# Patient Record
Sex: FEMALE | Race: WHITE | NOT HISPANIC OR LATINO | Employment: PART TIME | URBAN - METROPOLITAN AREA
[De-identification: names, ages, dates, MRNs, and addresses within clinical notes are randomized per-mention and may not be internally consistent; named-entity substitution may affect disease eponyms.]

---
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
## 2020-07-02 ENCOUNTER — HOSPITAL ENCOUNTER (EMERGENCY)
Facility: MEDICAL CENTER | Age: 20
End: 2020-07-02
Attending: EMERGENCY MEDICINE | Admitting: EMERGENCY MEDICINE
Payer: MEDICAID

## 2020-07-02 ENCOUNTER — APPOINTMENT (OUTPATIENT)
Dept: URGENT CARE | Facility: CLINIC | Age: 20
End: 2020-07-02
Payer: MEDICAID

## 2020-07-02 VITALS
TEMPERATURE: 98.3 F | HEIGHT: 65 IN | SYSTOLIC BLOOD PRESSURE: 125 MMHG | WEIGHT: 141.98 LBS | BODY MASS INDEX: 23.65 KG/M2 | HEART RATE: 113 BPM | OXYGEN SATURATION: 97 % | RESPIRATION RATE: 18 BRPM | DIASTOLIC BLOOD PRESSURE: 61 MMHG

## 2020-07-02 DIAGNOSIS — N64.4 BREAST PAIN: ICD-10-CM

## 2020-07-02 LAB — EKG IMPRESSION: NORMAL

## 2020-07-02 PROCEDURE — 93005 ELECTROCARDIOGRAM TRACING: CPT | Performed by: EMERGENCY MEDICINE

## 2020-07-02 PROCEDURE — 99283 EMERGENCY DEPT VISIT LOW MDM: CPT

## 2020-07-02 PROCEDURE — 93005 ELECTROCARDIOGRAM TRACING: CPT

## 2020-07-02 RX ORDER — CEPHALEXIN 500 MG/1
500 TABLET ORAL 3 TIMES DAILY
Qty: 21 TAB | Refills: 0 | Status: SHIPPED | OUTPATIENT
Start: 2020-07-02 | End: 2021-06-25

## 2020-07-02 NOTE — ED TRIAGE NOTES
".  Chief Complaint   Patient presents with   • Breast Pain     right     ./76   Pulse (!) 150   Temp 36.8 °C (98.3 °F) (Temporal)   Resp 18   Ht 1.651 m (5' 5\")   Wt 64.4 kg (141 lb 15.6 oz)   LMP  (Within Weeks)   SpO2 97%   BMI 23.63 kg/m²     Ambulatory to triage with above complaints, patient states \"I think I have mastitis\", wearing mask, denies respiratory complaints, placed in lobby with significant other.    "

## 2020-07-02 NOTE — ED PROVIDER NOTES
"ED Provider Note    CHIEF COMPLAINT  Chief Complaint   Patient presents with   • Breast Pain     right       HPI  Tiffany Lowe is a 20 y.o. female who presents with right breast pain since yesterday.  She is breast-feeding and has had mastitis once in the past.  She thinks it is mildly red and swollen.  No fevers, chills, cough, vomiting or diarrhea.    REVIEW OF SYSTEMS  Pertinent positives include: Right breast pain, breast feeding, tachycardia.  Pertinent negatives include: Fever, cough, sore throat.    PAST MEDICAL HISTORY  Past Medical History:   Diagnosis Date   • Asthma    • Depression        SOCIAL HISTORY  Here with her significant other.    CURRENT MEDICATIONS  Home Medications     Reviewed by Chelsea Tavarez R.N. (Registered Nurse) on 07/02/20 at 1305  Med List Status: Complete   Medication Last Dose Status   albuterol 108 (90 Base) MCG/ACT Aero Soln inhalation aerosol PRN Active   azithromycin (ZITHROMAX) 250 MG Tab Not Taking Active   cetirizine (ZYRTEC) 10 MG Tab  Active   docusate sodium (COLACE) 100 MG Cap  Active   Doxylamine Succinate, Sleep, (UNISOM PO)  Active   fluticasone (FLONASE) 50 MCG/ACT nasal spray  Active   ibuprofen (MOTRIN) 600 MG Tab  Active   MethylPREDNISolone (MEDROL DOSEPAK) 4 MG Tablet Therapy Pack  Active   oxycodone-acetaminophen (PERCOCET) 5-325 MG Tab  Active   Prenatal Multivit-Min-Fe-FA (PRENATAL VITAMINS PO)  Active   Sertraline HCl (ZOLOFT PO) Not Taking Active                ALLERGIES  No Known Allergies    PHYSICAL EXAM  VITAL SIGNS: /76   Pulse (!) 150   Temp 36.8 °C (98.3 °F) (Temporal)   Resp 18   Ht 1.651 m (5' 5\")   Wt 64.4 kg (141 lb 15.6 oz)   LMP  (Within Weeks)   SpO2 97%   BMI 23.63 kg/m²   Constitutional :  Well developed, Well nourished, tachycardic, afebrile, well-appearing.   Eyes: pupils reactive without eye discharge nor conjunctival hyperemia.  Lymphatic: No lymphangitic streaking  Cardiovascular: Tachycardic, regular rhythm, No " murmurs, No rubs, No gallops.  No cyanosis.   Respiratory: Rales, rhonchi, wheeze.  Right breast has tenderness between 1 and 3:00.  No masses no definite erythema or edema.  No discharge.  Abdomen:  Soft, nontender  Skin: Warm, dry, no erythema, no rash.   Musculoskeletal: no limb deformities.    EKG Interpretation 1:56 PM    Interpreted by me.  Indication: Tachycardia    Rhythm: normal sinus   Rate: Tachycardic at 133  Axis: normal  Ectopy: none  Conduction: normal  ST/T Waves: no acute change  Q Waves: none  R Wave progression: normal    Clinical Impression: Sinus tachycardia      COURSE & MEDICAL DECISION MAKING  Well-appearing patient has breast pain and may have mastitis.  No evidence of abscess.  No trauma.    PLAN:  New Prescriptions    CEPHALEXIN 500 MG TAB    Take 1 Tab by mouth 3 times a day.       Continue to breast-feed  Return for fever, spreading redness, swelling, purulence, lymphangitis  Follow-up at [facility] for imaging if not better next week    [facility], Emergency Dept  [address] [place] [id number] [phone]    As needed worsening redness, swelling, red streaks, fever or discharge of pus.      CONDITION:  Good.    FINAL IMPRESSION:  1. Breast pain          Electronically signed by: Salvatore Canela M.D., 7/2/2020

## 2020-07-02 NOTE — DISCHARGE INSTRUCTIONS
You may have mastitis.  Take antibiotic as prescribed and continue to breast-feed.  Follow-up at the [facility] for ultrasound if you are not better next week.  Return for persistent fever dizziness vomiting spreading redness around the parents.

## 2020-10-28 ENCOUNTER — APPOINTMENT (OUTPATIENT)
Dept: URGENT CARE | Facility: CLINIC | Age: 20
End: 2020-10-28
Payer: MEDICAID

## 2021-06-25 ENCOUNTER — OFFICE VISIT (OUTPATIENT)
Dept: URGENT CARE | Facility: CLINIC | Age: 21
End: 2021-06-25
Payer: MEDICAID

## 2021-06-25 ENCOUNTER — APPOINTMENT (OUTPATIENT)
Dept: URGENT CARE | Facility: CLINIC | Age: 21
End: 2021-06-25
Payer: MEDICAID

## 2021-06-25 ENCOUNTER — HOSPITAL ENCOUNTER (OUTPATIENT)
Facility: MEDICAL CENTER | Age: 21
End: 2021-06-25
Attending: NURSE PRACTITIONER
Payer: MEDICAID

## 2021-06-25 VITALS
HEART RATE: 93 BPM | RESPIRATION RATE: 16 BRPM | SYSTOLIC BLOOD PRESSURE: 122 MMHG | DIASTOLIC BLOOD PRESSURE: 60 MMHG | BODY MASS INDEX: 20.31 KG/M2 | WEIGHT: 129.4 LBS | HEIGHT: 67 IN | OXYGEN SATURATION: 96 % | TEMPERATURE: 99.5 F

## 2021-06-25 DIAGNOSIS — J06.9 UPPER RESPIRATORY TRACT INFECTION, UNSPECIFIED TYPE: ICD-10-CM

## 2021-06-25 DIAGNOSIS — R05.9 COUGH: ICD-10-CM

## 2021-06-25 DIAGNOSIS — J02.9 PHARYNGITIS, UNSPECIFIED ETIOLOGY: ICD-10-CM

## 2021-06-25 PROBLEM — M25.521 ARTHRALGIA OF RIGHT ELBOW: Status: ACTIVE | Noted: 2020-10-26

## 2021-06-25 PROBLEM — F43.21 ADJUSTMENT DISORDER WITH DEPRESSED MOOD: Status: ACTIVE | Noted: 2018-01-03

## 2021-06-25 PROBLEM — K92.9 GASTROINTESTINAL DISORDER: Status: ACTIVE | Noted: 2018-01-03

## 2021-06-25 LAB
INT CON NEG: NORMAL
INT CON POS: NORMAL
S PYO AG THROAT QL: NEGATIVE

## 2021-06-25 PROCEDURE — U0005 INFEC AGEN DETEC AMPLI PROBE: HCPCS

## 2021-06-25 PROCEDURE — 99213 OFFICE O/P EST LOW 20 MIN: CPT | Performed by: NURSE PRACTITIONER

## 2021-06-25 PROCEDURE — C9803 HOPD COVID-19 SPEC COLLECT: HCPCS

## 2021-06-25 PROCEDURE — U0003 INFECTIOUS AGENT DETECTION BY NUCLEIC ACID (DNA OR RNA); SEVERE ACUTE RESPIRATORY SYNDROME CORONAVIRUS 2 (SARS-COV-2) (CORONAVIRUS DISEASE [COVID-19]), AMPLIFIED PROBE TECHNIQUE, MAKING USE OF HIGH THROUGHPUT TECHNOLOGIES AS DESCRIBED BY CMS-2020-01-R: HCPCS

## 2021-06-25 PROCEDURE — 87880 STREP A ASSAY W/OPTIC: CPT | Performed by: NURSE PRACTITIONER

## 2021-06-25 RX ORDER — MEDROXYPROGESTERONE ACETATE 150 MG/ML
INJECTION, SUSPENSION INTRAMUSCULAR
COMMUNITY

## 2021-06-25 ASSESSMENT — ENCOUNTER SYMPTOMS
FEVER: 0
DIZZINESS: 0
MYALGIAS: 0
VOMITING: 0
CHILLS: 0
SORE THROAT: 1
RHINORRHEA: 1
EYE REDNESS: 0
COUGH: 1
HEADACHES: 1
SHORTNESS OF BREATH: 0
NAUSEA: 0

## 2021-06-25 NOTE — LETTER
June 25, 2021         Patient: Tiffany Lowe   YOB: 2000   Date of Visit: 6/25/2021           To Whom it May Concern:    Tiffany Lowe was seen in my clinic on 6/25/2021. She may return to work on 6/28/21, only if COVID test is negative.    If you have any questions or concerns, please don't hesitate to call.        Sincerely,           SANDRA Akbar.  Electronically Signed

## 2021-06-26 DIAGNOSIS — R05.9 COUGH: ICD-10-CM

## 2021-06-26 LAB
COVID ORDER STATUS COVID19: NORMAL
SARS-COV-2 RNA RESP QL NAA+PROBE: NOTDETECTED
SPECIMEN SOURCE: NORMAL

## 2021-06-26 NOTE — PROGRESS NOTES
Subjective:   Tiffany Lowe is a 21 y.o. female who presents for Cough (x1.5 wks pt states all symptoms come and go), Nasal Congestion (x1.5 wks ), Sore Throat (x1.5 wks), and Headache (x1.5 wks)      URI   This is a new problem. Episode onset: 1.5 weeks; no sick contacts. The problem has been gradually improving. There has been no fever. Associated symptoms include congestion, coughing, headaches, rhinorrhea and a sore throat. Pertinent negatives include no chest pain, dysuria, ear pain, nausea, plugged ear sensation, rash or vomiting. She has tried acetaminophen and sleep for the symptoms. The treatment provided moderate relief.       Review of Systems   Constitutional: Negative for chills and fever.   HENT: Positive for congestion, rhinorrhea and sore throat. Negative for ear pain.    Eyes: Negative for redness.   Respiratory: Positive for cough. Negative for shortness of breath.    Cardiovascular: Negative for chest pain.   Gastrointestinal: Negative for nausea and vomiting.   Genitourinary: Negative for dysuria.   Musculoskeletal: Negative for myalgias.   Skin: Negative for rash.   Neurological: Positive for headaches. Negative for dizziness.       Medications:    • UNISOM PO    Allergies: Patient has no known allergies.    Problem List: Tiffany Lowe does not have any pertinent problems on file.    Surgical History:  Past Surgical History:   Procedure Laterality Date   • REPEAT C SECTION  8/30/2018    Procedure: REPEAT C SECTION;  Surgeon: Oj Figueroa M.D.;  Location: LABOR AND DELIVERY;  Service: Gynecology   • PRIMARY C SECTION N/A 4/14/2017    Procedure: PRIMARY C SECTION;  Surgeon: Corby Harden M.D.;  Location: LABOR AND DELIVERY;  Service:    • TONSILLECTOMY  2007       Past Social Hx: Tiffany Lowe  reports that she quit smoking about 3 years ago. She has never used smokeless tobacco. She reports current alcohol use. She reports current drug use. Drug: Marijuana.     Past Family Hx:  "Tiffany Lowe family history includes No Known Problems in her brother, father, and mother.     Problem list, medications, and allergies reviewed by myself today in Epic.     Objective:     /60 (BP Location: Right arm, Patient Position: Sitting, BP Cuff Size: Adult)   Pulse 93   Temp 37.5 °C (99.5 °F) (Temporal)   Resp 16   Ht 1.702 m (5' 7\")   Wt 58.7 kg (129 lb 6.4 oz)   SpO2 96%   BMI 20.27 kg/m²     Physical Exam  Vitals and nursing note reviewed.   Constitutional:       General: She is not in acute distress.     Appearance: She is well-developed.   HENT:      Head: Normocephalic and atraumatic.      Right Ear: Tympanic membrane and external ear normal.      Left Ear: Tympanic membrane and external ear normal.      Nose: Nose normal.      Right Sinus: No maxillary sinus tenderness or frontal sinus tenderness.      Left Sinus: No maxillary sinus tenderness or frontal sinus tenderness.      Mouth/Throat:      Mouth: Mucous membranes are moist.      Pharynx: Uvula midline. No posterior oropharyngeal erythema.      Tonsils: No tonsillar exudate or tonsillar abscesses.   Eyes:      General:         Right eye: No discharge.         Left eye: No discharge.      Conjunctiva/sclera: Conjunctivae normal.   Cardiovascular:      Rate and Rhythm: Normal rate.   Pulmonary:      Effort: Pulmonary effort is normal. No respiratory distress.      Breath sounds: Normal breath sounds.   Abdominal:      General: There is no distension.   Musculoskeletal:         General: Normal range of motion.   Skin:     General: Skin is warm and dry.   Neurological:      General: No focal deficit present.      Mental Status: She is alert and oriented to person, place, and time. Mental status is at baseline.      Gait: Gait (gait at baseline) normal.   Psychiatric:         Judgment: Judgment normal.         Assessment/Plan:     Diagnosis and associated orders:     1. Cough  COVID/SARS CoV-2 PCR   2. Upper respiratory tract " infection, unspecified type     3. Pharyngitis, unspecified etiology  POCT Rapid Strep A      Comments/MDM:     • Strep negative  Discussed with patient signs and symptoms most likely are due to a viral etiology.     Test for COVID-19. Result will be reviewed by myself. We will call/message back for results and appropriate further instructions. Instructed to sign up for [employer] if they have not already. Result will be automatically released to [employer] application for patient review. I will be sending a message with Next Step Instructions to [employer] soon after resulted.   Symptomatic and supportive care:   Plenty of oral hydration and rest   Over the counter cough suppressant as directed.  Tylenol or ibuprofen for pain and fever as directed.   Warm salt water gargles for sore throat, soft foods, cool liquids.   Saline nasal spray and Flonase as a decongestant.   Infection control measures at home. Stay away from people, Hand washing, covering sneeze/cough, disinfect surfaces.   Remain home from work, school, and other populated environments. Work note provided with information of quarantine measures per CDC guidelines.   Overall, the patient is well-appearing. They are not hypoxic, afebrile, and a normal pulmonary exam.                 Please note that this dictation was created using voice recognition software. I have made a reasonable attempt to correct obvious errors, but I expect that there are errors of grammar and possibly content that I did not discover before finalizing the note.    This note was electronically signed by Jose ROSARIO.
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
## 2022-05-20 ENCOUNTER — OFFICE VISIT (OUTPATIENT)
Dept: URGENT CARE | Facility: CLINIC | Age: 22
End: 2022-05-20
Payer: MEDICAID

## 2022-05-20 VITALS
HEIGHT: 67 IN | DIASTOLIC BLOOD PRESSURE: 76 MMHG | OXYGEN SATURATION: 98 % | WEIGHT: 124 LBS | TEMPERATURE: 97.9 F | RESPIRATION RATE: 20 BRPM | SYSTOLIC BLOOD PRESSURE: 118 MMHG | BODY MASS INDEX: 19.46 KG/M2 | HEART RATE: 68 BPM

## 2022-05-20 DIAGNOSIS — H66.90 ACUTE RECURRENT OTITIS MEDIA: ICD-10-CM

## 2022-05-20 PROCEDURE — 99214 OFFICE O/P EST MOD 30 MIN: CPT | Performed by: NURSE PRACTITIONER

## 2022-05-20 RX ORDER — AMOXICILLIN 875 MG/1
875 TABLET, COATED ORAL 2 TIMES DAILY
Qty: 14 TABLET | Refills: 0 | Status: SHIPPED | OUTPATIENT
Start: 2022-05-20 | End: 2022-05-27

## 2022-05-20 NOTE — PROGRESS NOTES
Chief Complaint   Patient presents with   • Otalgia     Herman x 2 weeks       HISTORY OF PRESENT ILLNESS: Patient is a pleasant 22 y.o. female who presents to urgent care today with concerns of left-sided ear pain.  Patient notes that for the past 2 weeks she has had left-sided ear pain.  She initially was evaluated by her PCP and treated with topical antibiotic drops.  She denied any improvement and therefore her PCP called in azithromycin for her.  Patient completed this medication without improvement although has had some resolved.  She otherwise feels well denies any fever, chills, malaise.  She does have a history of mild seasonal allergies, does not take any medication for such.    Patient Active Problem List    Diagnosis Date Noted   • Feeling irritable 2021   • Arthralgia of right elbow 10/26/2020   • Postpartum care following  delivery 10/10/2018   • Depression 04/10/2018   • Asthma 04/10/2018   • Adjustment disorder with depressed mood 2018   • Gastrointestinal disorder 2018   • Healthy adolescent 2018       Allergies:Patient has no known allergies.    Current Outpatient Medications Ordered in Epic   Medication Sig Dispense Refill   • amoxicillin (AMOXIL) 875 MG tablet Take 1 Tablet by mouth 2 times a day for 7 days. 14 Tablet 0   • medroxyPROGESTERone (DEPO-PROVERA) 150 MG/ML Suspension medroxyprogesterone 150 mg/mL intramuscular suspension (Patient not taking: Reported on 2022)     • Doxylamine Succinate, Sleep, (UNISOM PO) Take  by mouth. (Patient not taking: Reported on 2022)       No current [facility]-ordered facility-administered medications on file.       Past Medical History:   Diagnosis Date   • Asthma    • Depression        Social History     Tobacco Use   • Smoking status: Former Smoker     Quit date: 1/10/2018     Years since quittin.3   • Smokeless tobacco: Never Used   • Tobacco comment: Vape   Vaping Use   • Vaping Use: Every day   • Substances:  "Flavoring   • Devices: Disposable   Substance Use Topics   • Alcohol use: Yes     Comment: socially    • Drug use: Yes     Types: Marijuana     Comment: Last used 2 weeks ago       Family Status   Relation Name Status   • Mo  Alive   • Fa  Alive   • Bro 4 Alive   • Neg Hx  (Not Specified)     Family History   Problem Relation Age of Onset   • No Known Problems Mother    • No Known Problems Father    • No Known Problems Brother    • Stroke Neg Hx        ROS:  Review of Systems   Constitutional: Negative for fever, chills, weight loss, malaise, and fatigue.   HENT: Positive for ear pain.   Negative for nosebleeds, congestion, sore throat and neck pain.    Eyes: Negative for vision changes.   Neuro: Negative for headache, sensory changes, weakness, seizure, LOC.   Cardiovascular: Negative for chest pain, palpitations, orthopnea and leg swelling.   Respiratory: Negative for cough, sputum production, shortness of breath and wheezing.   Gastrointestinal: Negative for abdominal pain, nausea, vomiting or diarrhea.   Genitourinary: Negative for dysuria, urgency and frequency.  Musculoskeletal: Negative for falls, neck pain, back pain, joint pain, myalgias.   Skin: Negative for rash, diaphoresis.     Exam:  /76 (BP Location: Left arm, Patient Position: Sitting, BP Cuff Size: Adult)   Pulse 68   Temp 36.6 °C (97.9 °F) (Temporal)   Resp 20   Ht 1.702 m (5' 7\")   Wt 56.2 kg (124 lb)   SpO2 98%   General: well-nourished, well-developed female in NAD  Head: normocephalic, atraumatic  Eyes: PERRLA, no conjunctival injection, acuity grossly intact, lids normal.  Ears: normal shape and symmetry, no tenderness, no discharge. External canals are without any significant edema or erythema.  Right tympanic membrane is without any inflammation, no effusion.  Left tympanic membrane is moderately injected, intact, scant effusion.  Gross auditory acuity is intact.  Nose: symmetrical without tenderness, clear " discharge.  Mouth/Throat: reasonable hygiene, no erythema, exudates or tonsillar enlargement.  Neck: no masses, range of motion within normal limits, no tracheal deviation. No obvious thyroid enlargement.   Lymph: no cervical adenopathy. No supraclavicular adenopathy.   Neuro: alert and oriented. Cranial nerves 1-12 grossly intact. No sensory deficit.   Cardiovascular: regular rate and rhythm. No edema.  Pulmonary: no distress. Chest is symmetrical with respiration, no wheezes, crackles, or rhonchi.   Musculoskeletal: no clubbing, appropriate muscle tone, gait is stable.  Skin: warm, dry, intact, no clubbing, no cyanosis, no rashes.   Psych: appropriate mood, affect, judgement.         Assessment/Plan:  1. Acute recurrent otitis media  amoxicillin (AMOXIL) 875 MG tablet       Suspect recurrent otitis media.  Amoxicillin as directed.  OTC allergy medication and Flonase encouraged.  Increase fluid intake and hydration.  Supportive care, differential diagnoses, and indications for immediate follow-up discussed with patient.   Pathogenesis of diagnosis discussed including typical length and natural progression.   Instructed to return to clinic or nearest emergency department for any change in condition, further concerns, or worsening of symptoms.  Patient states understanding of the plan of care and discharge instructions.  Instructed to make an appointment, for follow up, with her primary care provider.        Please note that this dictation was created using voice recognition software. I have made every reasonable attempt to correct obvious errors, but I expect that there are errors of grammar and possibly content that I did not discover before finalizing the note.      SANDRA Montes.
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
## 2023-07-10 ENCOUNTER — OFFICE VISIT (OUTPATIENT)
Dept: URGENT CARE | Facility: CLINIC | Age: 23
End: 2023-07-10
Payer: COMMERCIAL

## 2023-07-10 VITALS
WEIGHT: 220 LBS | TEMPERATURE: 98.5 F | HEIGHT: 63 IN | DIASTOLIC BLOOD PRESSURE: 82 MMHG | OXYGEN SATURATION: 98 % | BODY MASS INDEX: 38.98 KG/M2 | HEART RATE: 118 BPM | SYSTOLIC BLOOD PRESSURE: 118 MMHG | RESPIRATION RATE: 16 BRPM

## 2023-07-10 DIAGNOSIS — R05.1 ACUTE COUGH: ICD-10-CM

## 2023-07-10 DIAGNOSIS — J01.10 ACUTE NON-RECURRENT FRONTAL SINUSITIS: ICD-10-CM

## 2023-07-10 PROCEDURE — 3079F DIAST BP 80-89 MM HG: CPT | Performed by: PHYSICIAN ASSISTANT

## 2023-07-10 PROCEDURE — 99204 OFFICE O/P NEW MOD 45 MIN: CPT | Performed by: PHYSICIAN ASSISTANT

## 2023-07-10 PROCEDURE — 3074F SYST BP LT 130 MM HG: CPT | Performed by: PHYSICIAN ASSISTANT

## 2023-07-10 RX ORDER — PROGESTERONE 100 MG/1
CAPSULE ORAL
COMMUNITY
Start: 2023-06-05 | End: 2023-12-18

## 2023-07-10 RX ORDER — BENZONATATE 100 MG/1
100 CAPSULE ORAL 3 TIMES DAILY PRN
Qty: 60 CAPSULE | Refills: 0 | Status: SHIPPED | OUTPATIENT
Start: 2023-07-10 | End: 2023-12-18

## 2023-07-10 RX ORDER — GUAIFENESIN 600 MG/1
600 TABLET, EXTENDED RELEASE ORAL EVERY 12 HOURS
COMMUNITY
End: 2023-12-18

## 2023-07-10 RX ORDER — DIVALPROEX SODIUM 500 MG/1
1000 TABLET, EXTENDED RELEASE ORAL
COMMUNITY
Start: 2023-06-29 | End: 2023-12-18

## 2023-07-10 RX ORDER — CARIPRAZINE 1.5 MG/1
1.5 CAPSULE, GELATIN COATED ORAL
COMMUNITY
Start: 2023-06-15 | End: 2023-12-18

## 2023-07-10 RX ORDER — AMOXICILLIN AND CLAVULANATE POTASSIUM 875; 125 MG/1; MG/1
1 TABLET, FILM COATED ORAL 2 TIMES DAILY
Qty: 14 TABLET | Refills: 0 | Status: SHIPPED | OUTPATIENT
Start: 2023-07-10 | End: 2023-07-17

## 2023-07-10 RX ORDER — TRAZODONE HYDROCHLORIDE 50 MG/1
50 TABLET ORAL
COMMUNITY
Start: 2023-06-06 | End: 2023-12-18

## 2023-07-10 RX ORDER — SPIRONOLACTONE 100 MG/1
TABLET, FILM COATED ORAL
COMMUNITY
Start: 2023-06-05 | End: 2023-12-18

## 2023-07-10 ASSESSMENT — ENCOUNTER SYMPTOMS
FEVER: 0
SHORTNESS OF BREATH: 0
PALPITATIONS: 0
SPUTUM PRODUCTION: 0
WHEEZING: 0
CHILLS: 0
COUGH: 1
HEMOPTYSIS: 0
SINUS PAIN: 1

## 2023-07-10 NOTE — PROGRESS NOTES
Subjective:   Mariela Soni is a 23 y.o. female who presents today with   Chief Complaint   Patient presents with    Nasal Congestion     X 1.5wk     Cough     X 1.5 wk        Cough  This is a new problem. The current episode started 1 to 4 weeks ago. The problem has been gradually worsening. Associated symptoms include nasal congestion and postnasal drip. Pertinent negatives include no chest pain, chills, fever, hemoptysis, shortness of breath or wheezing. Treatments tried: mucinex, otc sinus. The treatment provided no relief.       PMH:  has no past medical history on file.  MEDS:   Current Outpatient Medications:     VRAYLAR 1.5 MG capsule, Take 1.5 mg by mouth at bedtime., Disp: , Rfl:     divalproex ER (DEPAKOTE ER) 500 MG TABLET SR 24 HR, Take 1,000 mg by mouth at bedtime., Disp: , Rfl:     progesterone (PROMETRIUM) 100 MG Cap, TAKE 1 CAPSULE BY MOUTH DAILY FOR GENDER AFFIRMING CARE., Disp: , Rfl:     spironolactone (ALDACTONE) 100 MG Tab, TAKE 2 TABLETS BY MOUTH ONCE DAILY FOR HORMONE REPLACEMENT THERAPY, Disp: , Rfl:     traZODone (DESYREL) 50 MG Tab, Take 50 mg by mouth at bedtime., Disp: , Rfl:     guaiFENesin ER (MUCINEX) 600 MG TABLET SR 12 HR, Take 600 mg by mouth every 12 hours., Disp: , Rfl:     amoxicillin-clavulanate (AUGMENTIN) 875-125 MG Tab, Take 1 Tablet by mouth 2 times a day for 7 days., Disp: 14 Tablet, Rfl: 0    benzonatate (TESSALON) 100 MG Cap, Take 1 Capsule by mouth 3 times a day as needed for Cough., Disp: 60 Capsule, Rfl: 0  ALLERGIES: No Known Allergies  SURGHX: No past surgical history on file.  SOCHX:    FH: Reviewed with patient, not pertinent to this visit.     Review of Systems   Constitutional:  Negative for chills and fever.   HENT:  Positive for congestion, postnasal drip and sinus pain.    Respiratory:  Positive for cough. Negative for hemoptysis, sputum production, shortness of breath and wheezing.    Cardiovascular:  Negative for chest pain and palpitations.  "       Objective:   /82   Pulse (!) 118   Temp 36.9 °C (98.5 °F)   Resp 16   Ht 1.6 m (5' 3\")   Wt 99.8 kg (220 lb)   SpO2 98%   BMI 38.97 kg/m²   Physical Exam  Vitals and nursing note reviewed.   Constitutional:       General: She is not in acute distress.     Appearance: Normal appearance. She is well-developed. She is not ill-appearing or toxic-appearing.   HENT:      Head: Normocephalic and atraumatic.      Right Ear: Hearing, tympanic membrane and ear canal normal.      Left Ear: Hearing, tympanic membrane and ear canal normal.      Nose: Mucosal edema and congestion present.      Right Sinus: Frontal sinus tenderness present.      Left Sinus: Frontal sinus tenderness present.      Mouth/Throat:      Mouth: Mucous membranes are moist.      Pharynx: No oropharyngeal exudate or posterior oropharyngeal erythema.   Cardiovascular:      Rate and Rhythm: Normal rate and regular rhythm.      Heart sounds: Normal heart sounds.   Pulmonary:      Effort: Pulmonary effort is normal.      Breath sounds: Normal breath sounds. No stridor. No wheezing, rhonchi or rales.   Musculoskeletal:      Comments: Normal movement in all 4 extremities   Skin:     General: Skin is warm and dry.   Neurological:      Mental Status: She is alert.      Coordination: Coordination normal.   Psychiatric:         Mood and Affect: Mood normal.         Assessment/Plan:   Assessment    1. Acute non-recurrent frontal sinusitis  - amoxicillin-clavulanate (AUGMENTIN) 875-125 MG Tab; Take 1 Tablet by mouth 2 times a day for 7 days.  Dispense: 14 Tablet; Refill: 0    2. Acute cough  - benzonatate (TESSALON) 100 MG Cap; Take 1 Capsule by mouth 3 times a day as needed for Cough.  Dispense: 60 Capsule; Refill: 0    Other orders  - VRAYLAR 1.5 MG capsule; Take 1.5 mg by mouth at bedtime.  - divalproex ER (DEPAKOTE ER) 500 MG TABLET SR 24 HR; Take 1,000 mg by mouth at bedtime.  - progesterone (PROMETRIUM) 100 MG Cap; TAKE 1 CAPSULE BY MOUTH " DAILY FOR GENDER AFFIRMING CARE.  - spironolactone (ALDACTONE) 100 MG Tab; TAKE 2 TABLETS BY MOUTH ONCE DAILY FOR HORMONE REPLACEMENT THERAPY  - traZODone (DESYREL) 50 MG Tab; Take 50 mg by mouth at bedtime.  - guaiFENesin ER (MUCINEX) 600 MG TABLET SR 12 HR; Take 600 mg by mouth every 12 hours.    Symptoms and presentation appear consistent with respiratory tract infection as well as sinus infection and we will treat accordingly with antibiotics.  Recommend use of over-the-counter symptomatic relief as well per 's instructions.  No indication for chest x-ray on exam today.    Differential diagnosis, natural history, supportive care, and indications for immediate follow-up discussed.   Patient given instructions and understanding of medications and treatment.    If not improving in 3-5 days, F/U with PCP or return to  if symptoms worsen.    Patient agreeable to plan.      Please note that this dictation was created using voice recognition software. I have made every reasonable attempt to correct obvious errors, but I expect that there are errors of grammar and possibly content that I did not discover before finalizing the note.    Jamarcus Zhao PA-C

## 2023-07-30 ENCOUNTER — OFFICE VISIT (OUTPATIENT)
Dept: URGENT CARE | Facility: CLINIC | Age: 23
End: 2023-07-30
Payer: COMMERCIAL

## 2023-07-30 ENCOUNTER — APPOINTMENT (OUTPATIENT)
Dept: RADIOLOGY | Facility: IMAGING CENTER | Age: 23
End: 2023-07-30
Attending: STUDENT IN AN ORGANIZED HEALTH CARE EDUCATION/TRAINING PROGRAM
Payer: COMMERCIAL

## 2023-07-30 VITALS
TEMPERATURE: 97.3 F | HEIGHT: 63 IN | DIASTOLIC BLOOD PRESSURE: 70 MMHG | SYSTOLIC BLOOD PRESSURE: 102 MMHG | OXYGEN SATURATION: 98 % | HEART RATE: 101 BPM | BODY MASS INDEX: 37.92 KG/M2 | WEIGHT: 214 LBS | RESPIRATION RATE: 16 BRPM

## 2023-07-30 DIAGNOSIS — S99.912A INJURY OF LEFT ANKLE, INITIAL ENCOUNTER: ICD-10-CM

## 2023-07-30 DIAGNOSIS — S92.152A AVULSION FRACTURE OF LEFT TALUS, CLOSED, INITIAL ENCOUNTER: ICD-10-CM

## 2023-07-30 PROCEDURE — 3074F SYST BP LT 130 MM HG: CPT | Performed by: STUDENT IN AN ORGANIZED HEALTH CARE EDUCATION/TRAINING PROGRAM

## 2023-07-30 PROCEDURE — 73630 X-RAY EXAM OF FOOT: CPT | Mod: TC,LT | Performed by: RADIOLOGY

## 2023-07-30 PROCEDURE — 73610 X-RAY EXAM OF ANKLE: CPT | Mod: TC,LT | Performed by: RADIOLOGY

## 2023-07-30 PROCEDURE — 3078F DIAST BP <80 MM HG: CPT | Performed by: STUDENT IN AN ORGANIZED HEALTH CARE EDUCATION/TRAINING PROGRAM

## 2023-07-30 PROCEDURE — 99214 OFFICE O/P EST MOD 30 MIN: CPT | Performed by: STUDENT IN AN ORGANIZED HEALTH CARE EDUCATION/TRAINING PROGRAM

## 2023-07-30 NOTE — LETTER
July 30, 2023    To Whom It May Concern:         This is confirmation that Mariela Soni attended her scheduled appointment with Luis E Hall P.A.-C. on 7/30/23.  Patient will need to use crutches and a walking boot while at work.  I would prefer the patient to be on light duty/desk duty until evaluated by orthopedics.  Patient should be able to be seen by orthopedics within the next 1 to 2 weeks.         If you have any questions please do not hesitate to call me at the phone number listed below.    Sincerely,          Luis E Hall P.A.-C.  [phone]

## 2023-07-30 NOTE — PROGRESS NOTES
"Subjective:   Mariela Soni is a 23 y.o. female who presents for Ankle Injury (L ankle )      HPI:  This is a pleasant 23-year-old female who presents to the urgent care for left ankle/foot injury.  She reports that she was going upstairs and struck the outside of her foot against the stairs causing her injury.  She reports pain to the lateral aspect of her ankle and the top of her foot.  She states that the foot hurts more than the ankle.  She is able to walk and stand but does have some discomfort with doing so.  Pain is controlled as long as she is not standing on her foot.  Ibuprofen/Tylenol at home.  No numbness, tingling, burning, radiation of pain into the toes or up the leg, laceration, fever, chills, shortness of breath, or chest pain.    Medications:    benzonatate Caps  divalproex ER Tb24  guaiFENesin ER Tb12  progesterone Caps  spironolactone Tabs  traZODone Tabs  Vraylar Caps    Allergies: Patient has no known allergies.    Problem List: Mariela Soni does not have a problem list on file.    Surgical History:  No past surgical history on file.    Past Social Hx: Mariela Soni       Past Family Hx:  Mariela Soni family history is not on file.     Problem list, medications, and allergies reviewed by myself today in Epic.     Objective:     /70 (BP Location: Left arm, Patient Position: Sitting, BP Cuff Size: Adult)   Pulse (!) 101   Temp 36.3 °C (97.3 °F) (Temporal)   Resp 16   Ht 1.6 m (5' 3\")   Wt 97.1 kg (214 lb)   SpO2 98%   BMI 37.91 kg/m²     Physical Exam  Vitals reviewed.   Constitutional:       General: She is not in acute distress.     Appearance: Normal appearance.   HENT:      Head: Normocephalic.   Eyes:      Pupils: Pupils are equal, round, and reactive to light.   Cardiovascular:      Rate and Rhythm: Normal rate.      Pulses: Normal pulses.   Pulmonary:      Effort: Pulmonary effort is normal.      Breath sounds: Normal breath " sounds.   Musculoskeletal:      Comments: Left ankle/foot: Mild swelling present to the lateral aspect of the ankle.  Tenderness to the lateral malleolus and proximal dorsal aspect of the foot.  Full range of motion and able to bear weight but does have pain when doing so.  2+ pedal pulse and sensation intact.  Cap refill less than 2 seconds.  No erythema or ecchymosis.  No proximal streaking.   Skin:     General: Skin is warm and dry.   Neurological:      Mental Status: She is alert.       RADIOLOGY RESULTS   DX-FOOT-COMPLETE 3+ LEFT    Result Date: 7/30/2023 7/30/2023 12:24 PM HISTORY/REASON FOR EXAM:  Pain/Deformity Following Trauma; Patient was walking upstairs and struck the lateral aspect of her foot/ankle against a stair.  Experiencing pain to the lateral malleolus and top of the foot. TECHNIQUE/EXAM DESCRIPTION AND NUMBER OF VIEWS: 3 views of the LEFT foot. COMPARISON:  Left ankle same time FINDINGS: There is no focal soft tissue swelling. There is a linear density seen just superior to the distal talus suspicious for an avulsion fracture. The alignment is maintained. Remainder of the foot is intact.     1.  There are findings suspicious for a linear avulsion fracture of the dorsal distal talus.    DX-ANKLE 3+ VIEWS LEFT    Result Date: 7/30/2023 7/30/2023 12:24 PM HISTORY/REASON FOR EXAM:  Pain/Deformity Following Trauma; Patient was walking upstairs and struck the lateral aspect of her foot/ankle against a stair.  Experiencing pain to the lateral malleolus and top of the foot. TECHNIQUE/EXAM DESCRIPTION AND NUMBER OF VIEWS:  3 views of the LEFT ankle. COMPARISON: None FINDINGS: There is no focal soft tissue swelling. There is no displaced fracture or dislocation of the tibia or fibula. There is a linear density suspicious for avulsion fracture involving the dorsal aspect of the distal talus. The alignment of the ankle is within normal limits.     1.  There are findings suspicious for linear avulsion of  the dorsal distal talus.             Assessment/Plan:     Diagnosis and associated orders:     1. Avulsion fracture of left talus, closed, initial encounter  DX-ANKLE 3+ VIEWS LEFT    DX-FOOT-COMPLETE 3+ LEFT    Referral to Orthopedics         Comments/MDM:     X-ray shows findings suspicious for linear avulsion of the dorsal distal talus.  X-ray imaging does correlate with physical exam findings/pain pattern.  Patient was placed in a rigid walking boot and given crutches.  Patient made nonweightbearing and urgent referral to orthopedics for further evaluation management.  Pain is tolerated with ibuprofen and Tylenol.  We will continue this at home.  Ice 3-5 times daily for 20 minutes at a time.  Elevate the foot as often as possible while at home.  Patient is neurovascular intact.  Strict ED/return precautions given.         Differential diagnosis, natural history, supportive care, and indications for immediate follow-up discussed.    Advised the patient to follow-up with the primary care physician for recheck, reevaluation, and consideration of further management.    Please note that this dictation was created using voice recognition software. I have made a reasonable attempt to correct obvious errors, but I expect that there are errors of grammar and possibly content that I did not discover before finalizing the note.    Electronically signed by Luis E Hall PA-C.

## 2023-07-31 NOTE — ED NOTES
Patient given discharge teaching and education. Verbalizes understanding. Given chance to ask questions, all questions answered. Educated patient of signs and symptoms to returned to ER, and educated patient they can return for any concerning symptoms. Patient states they have their belongings. Denies additional needs at this time. Reports pain is well controlled. Patient monitored every hour and PRN for safety and comfort. Patient ambulatory out of department.      Writer didn't see anything scanned in.

## 2023-12-08 ENCOUNTER — OFFICE VISIT (OUTPATIENT)
Dept: URGENT CARE | Facility: PHYSICIAN GROUP | Age: 23
End: 2023-12-08
Payer: COMMERCIAL

## 2023-12-08 VITALS
RESPIRATION RATE: 16 BRPM | OXYGEN SATURATION: 96 % | HEIGHT: 63 IN | HEART RATE: 102 BPM | TEMPERATURE: 97.7 F | WEIGHT: 207.4 LBS | SYSTOLIC BLOOD PRESSURE: 106 MMHG | BODY MASS INDEX: 36.75 KG/M2 | DIASTOLIC BLOOD PRESSURE: 72 MMHG

## 2023-12-08 DIAGNOSIS — M77.8 LEFT SHOULDER TENDONITIS: ICD-10-CM

## 2023-12-08 PROCEDURE — 99213 OFFICE O/P EST LOW 20 MIN: CPT | Performed by: NURSE PRACTITIONER

## 2023-12-08 PROCEDURE — 3078F DIAST BP <80 MM HG: CPT | Performed by: NURSE PRACTITIONER

## 2023-12-08 PROCEDURE — 3074F SYST BP LT 130 MM HG: CPT | Performed by: NURSE PRACTITIONER

## 2023-12-08 RX ORDER — METHYLPREDNISOLONE 4 MG/1
TABLET ORAL
Qty: 21 TABLET | Refills: 0 | Status: SHIPPED | OUTPATIENT
Start: 2023-12-08 | End: 2023-12-18

## 2023-12-08 RX ORDER — ESTRADIOL 0.1 MG/D
1 FILM, EXTENDED RELEASE TRANSDERMAL
COMMUNITY
Start: 2023-07-18 | End: 2023-12-18

## 2023-12-08 ASSESSMENT — ENCOUNTER SYMPTOMS
DIZZINESS: 0
ORTHOPNEA: 0
DIARRHEA: 0
SHORTNESS OF BREATH: 0
HEADACHES: 0
NECK PAIN: 0
TINGLING: 0
SENSORY CHANGE: 0
MYALGIAS: 1
VOMITING: 0
WEAKNESS: 0
FOCAL WEAKNESS: 0
NAUSEA: 0
FEVER: 0
CONSTIPATION: 0
BACK PAIN: 1
ABDOMINAL PAIN: 0
FALLS: 0
CHILLS: 0
PALPITATIONS: 0
FLANK PAIN: 0

## 2023-12-08 NOTE — PROGRESS NOTES
Subjective     Mariela Soni is a 23 y.o. female who presents with Shoulder Pain (Left shoulder/upper back pain x3d. Pt denies known injury to the shoulder. )            Shoulder Pain  Associated symptoms include myalgias. Pertinent negatives include no abdominal pain, chest pain, chills, fever, headaches, nausea, neck pain, rash, vomiting or weakness.    has been experiencing acute left shoulder pain and left-sided upper back pain that radiates from her neck x 3 days.  Denies any known injury, no fall previous trauma to shoulder.  No noted associated headache or difficulty breathing due to pain.  Will use Tylenol intermittently for discomfort but admits that she does not like to take medication.  No ice or heat application, no topical pain reliever.  States increased discomfort with reaching behind her and performing repetitive motions and lifting when she is at work.  No known cause for acute shoulder pain.   has been in this job x 3 months and has not had any problems prior to this week.      PMH:  has no past medical history on file.  MEDS:   Current Outpatient Medications:     estradiol (VIVELLE DOT) 0.1 MG/24HR PATCH BIWEEKLY, Place 1 Patch on the skin., Disp: , Rfl:     methylPREDNISolone (MEDROL DOSEPAK) 4 MG Tablet Therapy Pack, Follow schedule on package instructions., Disp: 21 Tablet, Rfl: 0    progesterone (PROMETRIUM) 100 MG Cap, TAKE 1 CAPSULE BY MOUTH DAILY FOR GENDER AFFIRMING CARE., Disp: , Rfl:     spironolactone (ALDACTONE) 100 MG Tab, TAKE 2 TABLETS BY MOUTH ONCE DAILY FOR HORMONE REPLACEMENT THERAPY, Disp: , Rfl:     VRAYLAR 1.5 MG capsule, Take 1.5 mg by mouth at bedtime. (Patient not taking: Reported on 12/8/2023), Disp: , Rfl:     divalproex ER (DEPAKOTE ER) 500 MG TABLET SR 24 HR, Take 1,000 mg by mouth at bedtime. (Patient not taking: Reported on 7/30/2023), Disp: , Rfl:     traZODone (DESYREL) 50 MG Tab, Take 50 mg by mouth at bedtime. (Patient not taking: Reported  "on 12/8/2023), Disp: , Rfl:     guaiFENesin ER (MUCINEX) 600 MG TABLET SR 12 HR, Take 600 mg by mouth every 12 hours. (Patient not taking: Reported on 7/30/2023), Disp: , Rfl:     benzonatate (TESSALON) 100 MG Cap, Take 1 Capsule by mouth 3 times a day as needed for Cough. (Patient not taking: Reported on 7/30/2023), Disp: 60 Capsule, Rfl: 0  ALLERGIES: No Known Allergies  SURGHX: History reviewed. No pertinent surgical history.  SOCHX:    FH: Family history was reviewed, no pertinent findings to report      Review of Systems   Constitutional:  Negative for chills, fever and malaise/fatigue.   Respiratory:  Negative for shortness of breath.    Cardiovascular:  Negative for chest pain, palpitations and orthopnea.   Gastrointestinal:  Negative for abdominal pain, constipation, diarrhea, nausea and vomiting.   Genitourinary:  Negative for dysuria, flank pain, frequency, hematuria and urgency.   Musculoskeletal:  Positive for back pain, joint pain and myalgias. Negative for falls and neck pain.   Skin:  Negative for itching and rash.   Neurological:  Negative for dizziness, tingling, sensory change, focal weakness, weakness and headaches.   All other systems reviewed and are negative.             Objective     /72 (BP Location: Right arm, Patient Position: Sitting, BP Cuff Size: Small adult)   Pulse (!) 102   Temp 36.5 °C (97.7 °F) (Temporal)   Resp 16   Ht 1.6 m (5' 3\")   Wt 94.1 kg (207 lb 6.4 oz)   SpO2 96%   BMI 36.74 kg/m²      Physical Exam  Vitals reviewed.   Constitutional:       General: She is awake. She is not in acute distress.     Appearance: Normal appearance. She is well-developed. She is not ill-appearing, toxic-appearing or diaphoretic.   HENT:      Head: Normocephalic.   Cardiovascular:      Rate and Rhythm: Normal rate.   Pulmonary:      Effort: Pulmonary effort is normal.   Musculoskeletal:      Left shoulder: Tenderness present. No swelling, deformity, effusion, laceration, bony " tenderness or crepitus. Decreased range of motion. Decreased strength. Normal pulse.        Arms:       Cervical back: Neck supple. Spasms and tenderness present. No swelling, edema, deformity, erythema, signs of trauma, lacerations, rigidity, torticollis, bony tenderness or crepitus. No pain with movement. Decreased range of motion.        Back:    Skin:     General: Skin is warm and dry.   Neurological:      Mental Status: She is alert and oriented to person, place, and time.   Psychiatric:         Attention and Perception: Attention normal.         Mood and Affect: Mood normal.         Speech: Speech normal.         Behavior: Behavior normal. Behavior is cooperative.                             Assessment & Plan        1. Left shoulder tendonitis    - methylPREDNISolone (MEDROL DOSEPAK) 4 MG Tablet Therapy Pack; Follow schedule on package instructions.  Dispense: 21 Tablet; Refill: 0  - Referral to Sports Medicine    -Declines muscle relaxant at this time   -Take over the counter Tylenol as needed for back discomfort, avoid any Ibuprofen products with oral steroid use  -May use cool compresses for any swelling /throbbing pain and warm compresses for muscle stiffness   -May perform gentle back muscle stretches as tolerated after warm compresses to maintain mobility, avoid abdominal crunches, heavy lifting or repetitive motions  -May apply topical analgesics as needed (preferred lidocaine based topical like salon Pas)  -Perform proper body mechanics with lifting, twisting, bending and walking. Ask for assistance with heavy objects as needed   -Monitor for bowel/urination problems, numbness/tingling in extremities, decreased range of michael with ambulation difficulty- need re-evaluation  Follow-up with sports medicine as needed if no improvement after oral steroid use

## 2023-12-08 NOTE — LETTER
December 8, 2023       Patient: Mariela Soin   YOB: 2000   Date of Visit: 12/8/2023         To Whom It May Concern:    In my medical opinion, I recommend that Mariela Soni be excused from work today as she was evaluated in clinic.     If you have any questions or concerns, please don't hesitate to call [phone]          Sincerely,          Chela Rajput A.P.R.N.  Electronically Signed

## 2023-12-18 ENCOUNTER — HOSPITAL ENCOUNTER (EMERGENCY)
Facility: MEDICAL CENTER | Age: 23
End: 2023-12-19
Attending: EMERGENCY MEDICINE
Payer: COMMERCIAL

## 2023-12-18 VITALS
SYSTOLIC BLOOD PRESSURE: 134 MMHG | DIASTOLIC BLOOD PRESSURE: 95 MMHG | WEIGHT: 206.35 LBS | HEART RATE: 99 BPM | RESPIRATION RATE: 18 BRPM | BODY MASS INDEX: 36.55 KG/M2 | TEMPERATURE: 96.8 F | OXYGEN SATURATION: 95 %

## 2023-12-18 DIAGNOSIS — R45.851 SUICIDAL IDEATION: ICD-10-CM

## 2023-12-18 DIAGNOSIS — T50.902A: ICD-10-CM

## 2023-12-18 LAB
AMPHET UR QL SCN: NEGATIVE
BARBITURATES UR QL SCN: NEGATIVE
BENZODIAZ UR QL SCN: NEGATIVE
BZE UR QL SCN: NEGATIVE
CANNABINOIDS UR QL SCN: NEGATIVE
FENTANYL UR QL: NEGATIVE
METHADONE UR QL SCN: NEGATIVE
OPIATES UR QL SCN: NEGATIVE
OXYCODONE UR QL SCN: NEGATIVE
PCP UR QL SCN: NEGATIVE
POC BREATHALIZER: 0 PERCENT (ref 0–0.01)
PROPOXYPH UR QL SCN: NEGATIVE

## 2023-12-18 PROCEDURE — 90791 PSYCH DIAGNOSTIC EVALUATION: CPT

## 2023-12-18 PROCEDURE — 302970 POC BREATHALIZER: Performed by: EMERGENCY MEDICINE

## 2023-12-18 PROCEDURE — A9270 NON-COVERED ITEM OR SERVICE: HCPCS | Mod: UD | Performed by: EMERGENCY MEDICINE

## 2023-12-18 PROCEDURE — 96372 THER/PROPH/DIAG INJ SC/IM: CPT

## 2023-12-18 PROCEDURE — 99285 EMERGENCY DEPT VISIT HI MDM: CPT

## 2023-12-18 PROCEDURE — 700102 HCHG RX REV CODE 250 W/ 637 OVERRIDE(OP): Mod: UD | Performed by: EMERGENCY MEDICINE

## 2023-12-18 PROCEDURE — 700111 HCHG RX REV CODE 636 W/ 250 OVERRIDE (IP): Mod: JZ,UD | Performed by: EMERGENCY MEDICINE

## 2023-12-18 PROCEDURE — 80307 DRUG TEST PRSMV CHEM ANLYZR: CPT

## 2023-12-18 RX ORDER — HALOPERIDOL 5 MG/ML
5 INJECTION INTRAMUSCULAR ONCE
Status: COMPLETED | OUTPATIENT
Start: 2023-12-18 | End: 2023-12-18

## 2023-12-18 RX ORDER — DIPHENHYDRAMINE HYDROCHLORIDE 50 MG/ML
50 INJECTION INTRAMUSCULAR; INTRAVENOUS ONCE
Status: COMPLETED | OUTPATIENT
Start: 2023-12-18 | End: 2023-12-18

## 2023-12-18 RX ORDER — DIAZEPAM 5 MG/1
5 TABLET ORAL ONCE
Status: COMPLETED | OUTPATIENT
Start: 2023-12-18 | End: 2023-12-18

## 2023-12-18 RX ORDER — SPIRONOLACTONE 100 MG/1
200 TABLET, FILM COATED ORAL DAILY
Status: SHIPPED | COMMUNITY
End: 2024-03-22

## 2023-12-18 RX ORDER — DIAZEPAM 5 MG/ML
2.5 INJECTION, SOLUTION INTRAMUSCULAR; INTRAVENOUS ONCE
Status: COMPLETED | OUTPATIENT
Start: 2023-12-18 | End: 2023-12-18

## 2023-12-18 RX ORDER — ESTRADIOL 0.1 MG/D
1 FILM, EXTENDED RELEASE TRANSDERMAL
Status: SHIPPED | COMMUNITY
End: 2024-03-22

## 2023-12-18 RX ADMIN — DIAZEPAM 2.5 MG: 5 INJECTION, SOLUTION INTRAMUSCULAR; INTRAVENOUS at 22:26

## 2023-12-18 RX ADMIN — DIAZEPAM 5 MG: 5 TABLET ORAL at 19:45

## 2023-12-18 RX ADMIN — HALOPERIDOL LACTATE 5 MG: 5 INJECTION, SOLUTION INTRAMUSCULAR at 22:27

## 2023-12-18 RX ADMIN — DIPHENHYDRAMINE HYDROCHLORIDE 50 MG: 50 INJECTION, SOLUTION INTRAMUSCULAR; INTRAVENOUS at 22:26

## 2023-12-19 NOTE — ED NOTES
Talked to patient about her medications ordered; states she doesn't need them now; she is not manic and will not do anything crazy. She said she will ask for it if she thinks she them already

## 2023-12-19 NOTE — ED NOTES
Report given to Eileen at [facility], discussed orders, poc and patient status, patient currently resting in r[place]ibley with eyes closed, visible chest rise and fall, 1:1 sitter remains at bedside.
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
## 2023-12-19 NOTE — ED NOTES
Patient discharged to [facility], 1 belongings bag (verified patient identification) given to jaquelin upon patient transfer. Patient ambulatory out of ed with steady gait, escorted by EMS

## 2023-12-19 NOTE — ED NOTES
Bedside report received from off going RN/tech: Em RN, assumed care of patient.  POC discussed with patient, all needs addressed at this time.       Fall risk interventions in place: Move the patient closer to the nurse's station, Place socks on patient, Place fall risk sign on patient's door, Keep floor surfaces clean and dry, and Accompanied to restroom (all applicable per [facility] Fall risk assessment)   Continuous monitoring: Not Applicable   IVF/IV medications: Not Applicable   Oxygen: Room Air  Bedside sitter: Pt on L2k SI with 1:1 sitter   (name), Report given to sitter, checklist completed, and checklist completed, stop sign in doorway  Isolation: Not Applicable

## 2023-12-19 NOTE — ED NOTES
"Med rec updated and complete. Allergies reviewed.  Interviewed pt at bedside.  Pt has not been taking the vraylar, trazodone, depokote or progesterone. Pt stated it has been a \"few months\".    Estradiol patch is do to be changed on 12/20/23.      Home pharmacy   [facility] = [phone]  "

## 2023-12-19 NOTE — ED NOTES
Patient is covering and wrapping her blanket to her head; despite being told and warnings; patient keeps on doing it. Escalated to security; patient is very upset and states she doesn't want to stay here anymore 'coz her girlfriend is not allowed to come. Explained to her the process's but patient is not taking it. Patient refusing for medications and trying to fight back; security at bedside for assist.  Medicated per MAR

## 2023-12-19 NOTE — DISCHARGE PLANNING
Alert Team:     Referral: Adult Patient Transfer to Mental Health Facility     Intervention: Received call from Jermaine at [facility] stating that [facility] has accepted the patient for admission.  Facility requests that transport be arranged for (time).     Arranged for transportation to be set up through [facility] with [facility] for [facility] transport.     The pt will be picked up at 0130.      Notified the RN of the departure time as well as accepting facility.      Created transfer packet and placed on chart.      Plan: Pt will transfer to [facility] at 0130.

## 2023-12-19 NOTE — ED NOTES
Bedside report received from previous shift.   Assumed patient care. Verified patient identification.  Checked on bed, with unlabored respirations. Discussed plan of care.    Denied any new complaints.   Gurney in low position, side rail up for pt safety. No needs identified at the moment.On 1:1; L2K; sitter available in direct view of the patient at all times.    Contraptions: none  Alert and Oriented: X4  Ambulatory: yes  Oxygen: none  Pending: alert team to see pt.

## 2023-12-19 NOTE — ED NOTES
Pt to bathroom, UA obtained, pt's placed in scrubs, personal items place in bag    1:1 Observation. Continuous visual monitoring by Trained Personnel. Sitter has unobstructed view of patient at all times. Discussion with sitter about patient care, safety, and support.

## 2023-12-19 NOTE — ED PROVIDER NOTES
"ED Provider Note    CHIEF COMPLAINT  Chief Complaint   Patient presents with    Suicidal Ideation     Pt states thoughts for weeks to months.  Has not acted on these thoughts       HPI/ROS  LIMITATION TO HISTORY   Select: : None  OUTSIDE HISTORIAN(S):  Significant other states that she found out that her partner tried to kill her self earlier this week and she been acting very suicidal    Mariela Olivia Soni is a 23 y.o. female who presents to the emergency department with chief complaint of depression and suicidal thoughts.  Patient states that she is been on meds and she was in eighth grade but has not been on any for the last 3 months.  Sees a psychologist but not a psychiatrist and can has not been able to get back on medications.  States was recently diagnosed with PTSD and is having really hard time coping with this diagnosis.  She states that earlier this week she took several sleeping pills in an attempt to self injure herself.  Has not done anything in the last 24 hours.  But feels like that she just cannot handle anything and everything is falling apart around her.    When I asked the patient if she had done anything to harm herself she told me \"that is a trick question\" because she knew when she told me that she had ingested some pills earlier this week but I would put on a legal hold.    PAST MEDICAL HISTORY       SURGICAL HISTORY  patient denies any surgical history    FAMILY HISTORY  No family history on file.    SOCIAL HISTORY  Social History     Tobacco Use    Smoking status: Not on file    Smokeless tobacco: Not on file   Substance and Sexual Activity    Alcohol use: Not on file    Drug use: Not on file    Sexual activity: Not on file       CURRENT MEDICATIONS  Home Medications    **Home medications have not yet been reviewed for this encounter**         ALLERGIES  No Known Allergies    PHYSICAL EXAM  VITAL SIGNS: BP (!) 134/95   Pulse 99   Temp 36 °C (96.8 °F) (Temporal)   Resp 18   Wt " 93.6 kg (206 lb 5.6 oz)   SpO2 95%   BMI 36.55 kg/m²    Pulse OX: Pulse Oxygen level is normal  Constitutional: Alert in no apparent distress.  HENT: Normocephalic, Atraumatic, MMM  Eyes: PERound. Conjunctiva normal, non-icteric.   Heart: Regular rate and rhythm, intact distal pulses   Lungs: Symmetrical movement, no resp distress   Abdomen: Non-tender, non-distended, normal bowel sounds  EXT/Back no edema  Skin: Warm, Dry, No erythema, No rash.   Neurologic: Alert and oriented, Grossly non-focal.   PSYCH: Flat affect some tearful at times depressed suicidal ideation poor impulse control      DIAGNOSTIC STUDIES / PROCEDURES      LABS  Labs Reviewed   POC BREATHALIZER - Normal   URINE DRUG SCREEN         COURSE & MEDICAL DECISION MAKING    ED Observation Status? Yes; I am placing the patient in to an observation status due to a diagnostic uncertainty as well as therapeutic intensity. Patient placed in observation status at 6:32 PM, 12/18/2023.     Observation plan is as follows: oral meds, legal hold behavioral health eval     1:25 AM 12/19  Patient is going to an inpatient facility for psychiatric care and is released from ED obs at this time.    INITIAL ASSESSMENT, COURSE AND PLAN  Care Narrative:     DISPOSITION AND DISCUSSIONS  Patient is a 23-year-old female presents emergency department with a long history of depression anxiety and now recently diagnosed PTSD.  Has not been on medications for several months.  However did have leftover sleeping pills that took several of earlier this week and attempt herself.  She is been placed on a legal hold for safety she is not happy about this.  I did discuss this case with behavioral health and they agree she likely needs hospitalization due to poor impulse control and suicidal ideation with intent.    10:35 PM.  The patient was coming wrapping herself in blankets and then escalated that she was trying to strangle herself with the blankets.  They were taken from her and  the patient was given IM Haldol Benadryl and Valium      She is very distraught that she cannot have her partner back here with her.      I have discussed management of the patient with the following physicians and KAYLA's:  none    Discussion of management with other QHP or appropriate source(s): Behavioral Health for legal hold         FINAL DIAGNOSIS  1. Suicidal ideation    2. Ingested substance, unknown drug, intentional self-harm, initial encounter ([facility])           Electronically signed by: Heidi Bailey M.D., 12/18/2023 6:32 PM

## 2023-12-19 NOTE — ED NOTES
Bedside report received from off going RN: Maryuri, assumed care of patient.  POC discussed with patient. Call light within reach, all needs addressed at this time.       Fall risk interventions in place: Not Applicable (all applicable per [facility] Fall risk assessment)   Continuous monitoring: Not Applicable   IVF/IV medications: Not Applicable   Oxygen: Room Air  Bedside sitter: Pt on L2k SI with 1:1 sitter danilo (name), Report given to sitter, checklist completed, and checklist completed, stop sign in doorway  Isolation: Not Applicable

## 2023-12-19 NOTE — ED NOTES
Checked on bed,with unlabored respirations. No safety risk noted  Sleeping  Sitter - 1:1 with continuous visual monitoring by Trained Personnel  Continued safety precaution  Inocenciorbulmaro in low position, side rail up for pt safety.   No needs identified at the moment

## 2023-12-19 NOTE — ED TRIAGE NOTES
Chief Complaint   Patient presents with    Suicidal Ideation     Pt states thoughts for weeks to months.  Has not acted on these thoughts

## 2024-01-07 ENCOUNTER — OFFICE VISIT (OUTPATIENT)
Dept: URGENT CARE | Facility: PHYSICIAN GROUP | Age: 24
End: 2024-01-07
Payer: COMMERCIAL

## 2024-01-07 VITALS
HEIGHT: 63 IN | TEMPERATURE: 97.8 F | BODY MASS INDEX: 37.01 KG/M2 | DIASTOLIC BLOOD PRESSURE: 62 MMHG | SYSTOLIC BLOOD PRESSURE: 98 MMHG | RESPIRATION RATE: 16 BRPM | WEIGHT: 208.89 LBS | HEART RATE: 98 BPM | OXYGEN SATURATION: 96 %

## 2024-01-07 DIAGNOSIS — R05.1 ACUTE COUGH: ICD-10-CM

## 2024-01-07 DIAGNOSIS — J06.9 VIRAL UPPER RESPIRATORY INFECTION: ICD-10-CM

## 2024-01-07 LAB
FLUAV RNA SPEC QL NAA+PROBE: NEGATIVE
FLUBV RNA SPEC QL NAA+PROBE: NEGATIVE
RSV RNA SPEC QL NAA+PROBE: NEGATIVE
SARS-COV-2 RNA RESP QL NAA+PROBE: NEGATIVE

## 2024-01-07 PROCEDURE — 3074F SYST BP LT 130 MM HG: CPT

## 2024-01-07 PROCEDURE — 3078F DIAST BP <80 MM HG: CPT

## 2024-01-07 PROCEDURE — 99213 OFFICE O/P EST LOW 20 MIN: CPT

## 2024-01-07 PROCEDURE — 0241U POCT CEPHEID COV-2, FLU A/B, RSV - PCR: CPT

## 2024-01-07 RX ORDER — CARIPRAZINE 3 MG/1
3 CAPSULE, GELATIN COATED ORAL
COMMUNITY
Start: 2023-12-22 | End: 2024-03-22

## 2024-01-07 RX ORDER — ATENOLOL 25 MG/1
25 TABLET ORAL 2 TIMES DAILY PRN
COMMUNITY
Start: 2023-12-22 | End: 2024-03-24

## 2024-01-07 RX ORDER — BENZONATATE 100 MG/1
100 CAPSULE ORAL 3 TIMES DAILY PRN
Qty: 30 CAPSULE | Refills: 0 | Status: SHIPPED | OUTPATIENT
Start: 2024-01-07 | End: 2024-03-22

## 2024-01-07 ASSESSMENT — ENCOUNTER SYMPTOMS
DIARRHEA: 0
MYALGIAS: 0
CHILLS: 0

## 2024-01-07 NOTE — LETTER
"[facility] [address] [address]     January 7, 2024    Patient: Mariela Soni   YOB: 2000   Date of Visit: 1/7/2024       To Whom It May Concern:    Mariela Soni was seen and treated in our department on 1/7/2024.     Sincerely,     Jami Lugo \"Sweet\" [facility]                 "

## 2024-02-03 ENCOUNTER — OFFICE VISIT (OUTPATIENT)
Dept: URGENT CARE | Facility: CLINIC | Age: 24
End: 2024-02-03
Payer: COMMERCIAL

## 2024-02-03 VITALS
TEMPERATURE: 96.9 F | HEART RATE: 118 BPM | OXYGEN SATURATION: 97 % | SYSTOLIC BLOOD PRESSURE: 124 MMHG | DIASTOLIC BLOOD PRESSURE: 92 MMHG | BODY MASS INDEX: 37.21 KG/M2 | RESPIRATION RATE: 16 BRPM | WEIGHT: 210 LBS | HEIGHT: 63 IN

## 2024-02-03 DIAGNOSIS — J01.10 ACUTE NON-RECURRENT FRONTAL SINUSITIS: ICD-10-CM

## 2024-02-03 DIAGNOSIS — J02.9 SORE THROAT: ICD-10-CM

## 2024-02-03 LAB — S PYO DNA SPEC NAA+PROBE: NOT DETECTED

## 2024-02-03 PROCEDURE — 99213 OFFICE O/P EST LOW 20 MIN: CPT | Performed by: PHYSICIAN ASSISTANT

## 2024-02-03 PROCEDURE — 3074F SYST BP LT 130 MM HG: CPT | Performed by: PHYSICIAN ASSISTANT

## 2024-02-03 PROCEDURE — 3080F DIAST BP >= 90 MM HG: CPT | Performed by: PHYSICIAN ASSISTANT

## 2024-02-03 PROCEDURE — 87651 STREP A DNA AMP PROBE: CPT | Performed by: PHYSICIAN ASSISTANT

## 2024-02-03 RX ORDER — AMOXICILLIN AND CLAVULANATE POTASSIUM 875; 125 MG/1; MG/1
1 TABLET, FILM COATED ORAL 2 TIMES DAILY
Qty: 14 TABLET | Refills: 0 | Status: SHIPPED | OUTPATIENT
Start: 2024-02-03 | End: 2024-02-10

## 2024-02-03 ASSESSMENT — ENCOUNTER SYMPTOMS
WHEEZING: 0
DIARRHEA: 1
FLANK PAIN: 0
COUGH: 1
CHILLS: 0
SINUS PAIN: 1
FEVER: 0
PALPITATIONS: 0
BLOOD IN STOOL: 0
SHORTNESS OF BREATH: 0
HEMOPTYSIS: 0
CONSTIPATION: 0
ABDOMINAL PAIN: 1
VOMITING: 1
NAUSEA: 1
SPUTUM PRODUCTION: 0

## 2024-02-03 NOTE — LETTER
February 3, 2024         Patient: Mariela Soni   YOB: 2000   Date of Visit: 2/3/2024           To Whom it May Concern:    Mariela Soni was seen in my clinic on 2/3/2024.  Please excuse patient's absence yesterday.    If you have any questions or concerns, please don't hesitate to call.        Sincerely,           Jamarcus Zhao P.A.-C.  Electronically Signed

## 2024-02-03 NOTE — PROGRESS NOTES
Subjective:   Mariela Soni is a 23 y.o. female who presents today with   Chief Complaint   Patient presents with    Vomiting     X 1 day, diarrhea, vomiting, headache, cough x 1 month, congestion     Vomiting  This is a new problem. The current episode started 1 to 4 weeks ago. Associated symptoms include abdominal pain, congestion, coughing, nausea and vomiting. Pertinent negatives include no chest pain, chills or fever.     Patient was last seen in urgent care January 7 for viral upper respiratory infection and treated with benzonatate at that time.  Patient states she is transgender and no concern of chance of pregnancy.  Vomiting and diarrhea over the last few days.    PMH:  has no past medical history on file.  MEDS:   Current Outpatient Medications:     amoxicillin-clavulanate (AUGMENTIN) 875-125 MG Tab, Take 1 Tablet by mouth 2 times a day for 7 days., Disp: 14 Tablet, Rfl: 0    atenolol (TENORMIN) 25 MG Tab, Take 25 mg by mouth 2 times a day as needed., Disp: , Rfl:     VRAYLAR 3 MG capsule, Take 3 mg by mouth at bedtime., Disp: , Rfl:     benzonatate (TESSALON) 100 MG Cap, Take 1 Capsule by mouth 3 times a day as needed for Cough. (Patient not taking: Reported on 2/3/2024), Disp: 30 Capsule, Rfl: 0    estradiol (VIVELLE DOT) 0.1 MG/24HR PATCH BIWEEKLY, Place 1 Patch on the skin every 72 hours. * left thigh* (Patient not taking: Reported on 2/3/2024), Disp: , Rfl:     spironolactone (ALDACTONE) 100 MG Tab, Take 200 mg by mouth every day. (Patient not taking: Reported on 2/3/2024), Disp: , Rfl:   ALLERGIES: No Known Allergies  SURGHX: History reviewed. No pertinent surgical history.  SOCHX:  reports that she has never smoked. She has never used smokeless tobacco. She reports current alcohol use. She reports current drug use. Drugs: Inhaled and Marijuana.  FH: Reviewed with patient, not pertinent to this visit.     Review of Systems   Constitutional:  Negative for chills and fever.   HENT:   "Positive for congestion and sinus pain.    Respiratory:  Positive for cough. Negative for hemoptysis, sputum production, shortness of breath and wheezing.    Cardiovascular:  Negative for chest pain and palpitations.   Gastrointestinal:  Positive for abdominal pain, diarrhea, nausea and vomiting. Negative for blood in stool, constipation and melena.   Genitourinary:  Negative for dysuria, flank pain, frequency, hematuria and urgency.      Objective:   BP (!) 124/92 (BP Location: Left arm, Patient Position: Sitting, BP Cuff Size: Large adult)   Pulse (!) 118   Temp 36.1 °C (96.9 °F) (Temporal)   Resp 16   Ht 1.6 m (5' 3\")   Wt 95.3 kg (210 lb)   SpO2 97%   BMI 37.20 kg/m²   Physical Exam  Vitals and nursing note reviewed.   Constitutional:       General: She is not in acute distress.     Appearance: Normal appearance. She is well-developed. She is not ill-appearing or toxic-appearing.   HENT:      Head: Normocephalic and atraumatic.      Right Ear: Hearing, tympanic membrane and ear canal normal.      Left Ear: Hearing, tympanic membrane and ear canal normal.      Nose: Mucosal edema and congestion present.      Right Sinus: Frontal sinus tenderness present.      Left Sinus: Frontal sinus tenderness present.      Mouth/Throat:      Mouth: Mucous membranes are moist.      Pharynx: Uvula midline. Posterior oropharyngeal erythema present. No oropharyngeal exudate or uvula swelling.      Tonsils: No tonsillar exudate or tonsillar abscesses.   Cardiovascular:      Rate and Rhythm: Normal rate and regular rhythm.      Heart sounds: Normal heart sounds.   Pulmonary:      Effort: Pulmonary effort is normal. No respiratory distress.      Breath sounds: Normal breath sounds. No stridor. No wheezing, rhonchi or rales.   Abdominal:      General: Bowel sounds are normal. There is no distension.      Palpations: Abdomen is soft.      Tenderness: There is no abdominal tenderness. There is no guarding.   Musculoskeletal:      " Comments: Normal movement in all 4 extremities   Skin:     General: Skin is warm and dry.   Neurological:      Mental Status: She is alert.      Coordination: Coordination normal.   Psychiatric:         Mood and Affect: Mood normal.       STREP A -    Assessment/Plan:   Assessment    1. Acute non-recurrent frontal sinusitis  - amoxicillin-clavulanate (AUGMENTIN) 875-125 MG Tab; Take 1 Tablet by mouth 2 times a day for 7 days.  Dispense: 14 Tablet; Refill: 0    2. Sore throat  - POCT GROUP A STREP, PCR    Symptoms and presentation appear consistent with frontal sinus infection at this time and we will treat accordingly with antibiotics.  Would recommend use of sinus rinse and over-the-counter remedies per 's instructions.    Differential diagnosis, natural history, supportive care, and indications for immediate follow-up discussed.   Patient given instructions and understanding of medications and treatment.    If not improving in 3-5 days, F/U with PCP or return to  if symptoms worsen.    Patient agreeable to plan.      Please note that this dictation was created using voice recognition software. I have made every reasonable attempt to correct obvious errors, but I expect that there are errors of grammar and possibly content that I did not discover before finalizing the note.    Jamarcus Zhao PA-C

## 2024-02-20 ENCOUNTER — OFFICE VISIT (OUTPATIENT)
Dept: URGENT CARE | Facility: CLINIC | Age: 24
End: 2024-02-20
Payer: COMMERCIAL

## 2024-02-20 VITALS
BODY MASS INDEX: 37.03 KG/M2 | RESPIRATION RATE: 18 BRPM | WEIGHT: 209 LBS | HEIGHT: 63 IN | SYSTOLIC BLOOD PRESSURE: 112 MMHG | TEMPERATURE: 97.9 F | HEART RATE: 107 BPM | OXYGEN SATURATION: 95 % | DIASTOLIC BLOOD PRESSURE: 78 MMHG

## 2024-02-20 DIAGNOSIS — J32.9 SINUSITIS, UNSPECIFIED CHRONICITY, UNSPECIFIED LOCATION: ICD-10-CM

## 2024-02-20 DIAGNOSIS — H61.23 BILATERAL IMPACTED CERUMEN: ICD-10-CM

## 2024-02-20 DIAGNOSIS — H69.91 DYSFUNCTION OF RIGHT EUSTACHIAN TUBE: ICD-10-CM

## 2024-02-20 PROCEDURE — 3074F SYST BP LT 130 MM HG: CPT

## 2024-02-20 PROCEDURE — 3078F DIAST BP <80 MM HG: CPT

## 2024-02-20 PROCEDURE — 99213 OFFICE O/P EST LOW 20 MIN: CPT

## 2024-02-20 NOTE — PROGRESS NOTES
"Subjective:   Mariela Soni is a 23 y.o. adult who presents for Sinus Problem (Patient states was seen 02/03/24 for a sinus infection, still not any better) and Otalgia (Patient states ear is \"clogged\" rt side ear, ear pain started this morning )      HPI:    Patient presents to urgent care with concerns of right ear pain.  Reports she recently completed a course of antibiotics at the beginning of the month for a sinus infection.  States her symptoms did resolve.  She states she woke up this morning with right ear pain.  She describes pain as a clogged sensation,  States she has a history of cerumen impaction.  States last time she had her ears irrigated was approximately 2 years ago.  Endorses muffling of sounds.  Denies otorrhea, radiation of pain.  Denies fever, chills.  Denies rhinorrhea, nasal congestion, cough.      ROS As above in HPI    Medications:    Current Outpatient Medications on File Prior to Visit   Medication Sig Dispense Refill    atenolol (TENORMIN) 25 MG Tab Take 25 mg by mouth 2 times a day as needed.      VRAYLAR 3 MG capsule Take 3 mg by mouth at bedtime.      benzonatate (TESSALON) 100 MG Cap Take 1 Capsule by mouth 3 times a day as needed for Cough. (Patient not taking: Reported on 2/3/2024) 30 Capsule 0    estradiol (VIVELLE DOT) 0.1 MG/24HR PATCH BIWEEKLY Place 1 Patch on the skin every 72 hours. * left thigh* (Patient not taking: Reported on 2/3/2024)      spironolactone (ALDACTONE) 100 MG Tab Take 200 mg by mouth every day. (Patient not taking: Reported on 2/3/2024)       No current facility-administered medications on file prior to visit.        Allergies:   Patient has no known allergies.    Problem List:   There is no problem list on file for this patient.       Surgical History:  No past surgical history on file.    Past Social Hx:   Social History     Tobacco Use    Smoking status: Never    Smokeless tobacco: Never   Vaping Use    Vaping Use: Never used   Substance Use " "Topics    Alcohol use: Yes     Comment: Rarely    Drug use: Yes     Types: Inhaled, Marijuana     Comment: occ          Problem list, medications, and allergies reviewed by myself today in Epic.     Objective:     /78 (BP Location: Left arm, Patient Position: Sitting, BP Cuff Size: Large adult)   Pulse (!) 107   Temp 36.6 °C (97.9 °F)   Resp 18   Ht 1.6 m (5' 3\")   Wt 94.8 kg (209 lb)   SpO2 95%   BMI 37.02 kg/m²     Physical Exam  Vitals and nursing note reviewed.   Constitutional:       General: She is not in acute distress.     Appearance: Normal appearance. She is not ill-appearing.   HENT:      Head: Normocephalic.      Right Ear: No laceration, drainage, swelling or tenderness. A middle ear effusion is present. There is impacted cerumen. No mastoid tenderness. Tympanic membrane is not erythematous or bulging.      Left Ear: No laceration, drainage, swelling or tenderness.  No middle ear effusion. There is impacted cerumen. No mastoid tenderness. Tympanic membrane is retracted. Tympanic membrane is not erythematous or bulging.      Nose: Nose normal.      Mouth/Throat:      Mouth: Mucous membranes are moist.      Pharynx: Oropharynx is clear.   Cardiovascular:      Rate and Rhythm: Regular rhythm. Tachycardia present.      Heart sounds: Normal heart sounds.   Pulmonary:      Effort: Pulmonary effort is normal.      Breath sounds: Normal breath sounds.   Abdominal:      General: Bowel sounds are normal.      Palpations: Abdomen is soft.   Musculoskeletal:      Cervical back: No rigidity or tenderness.   Lymphadenopathy:      Cervical: No cervical adenopathy.   Skin:     General: Skin is warm and dry.   Neurological:      Mental Status: She is alert and oriented to person, place, and time.         Assessment/Plan:       Diagnosis and associated orders:   1. Dysfunction of right eustachian tube    2. Bilateral impacted cerumen  - Ear Irrigation; Future    3. Sinusitis, unspecified chronicity, " unspecified location       Comments/MDM:     Bilateral cerumen impaction cleared by irrigation.   Right TM - Effusion is present, rhinorrhea and nasal congestion are also present.   Increase clear fluid intake, rest.  Start Antihistamines, Flonase, topical decongestant.  Salt water gargles, ice chips to soothe throat, lozenges  Increase emitted use humidifier by bedside.    Exposure to steam for expectoration.  Nasal saline as needed.  Return to  if not improved over the next week   Follow-up with primary care advised.         Return to clinic or go to ED if symptoms worsen or persist. Indications for ED discussed at length. Patient/Parent/Guardian voices understanding. Follow-up with your primary care provider in 3-5 days. Red flag symptoms discussed. All side effects of medication discussed including allergic response, GI upset, tendon injury, rash, sedation etc.    Please note that this dictation was created using voice recognition software. I have made a reasonable attempt to correct obvious errors, but I expect that there are errors of grammar and possibly content that I did not discover before finalizing the note.    This note was electronically signed by HORACIO Robison

## 2024-03-07 ENCOUNTER — HOSPITAL ENCOUNTER (EMERGENCY)
Facility: MEDICAL CENTER | Age: 24
End: 2024-03-08
Attending: EMERGENCY MEDICINE
Payer: COMMERCIAL

## 2024-03-07 DIAGNOSIS — J03.00 STREP TONSILLITIS: ICD-10-CM

## 2024-03-07 DIAGNOSIS — J02.0 STREP PHARYNGITIS: ICD-10-CM

## 2024-03-07 LAB
FLUAV RNA SPEC QL NAA+PROBE: NEGATIVE
FLUBV RNA SPEC QL NAA+PROBE: NEGATIVE
HETEROPH AB SER QL: NEGATIVE
RSV RNA SPEC QL NAA+PROBE: NEGATIVE
S PYO DNA SPEC NAA+PROBE: DETECTED
SARS-COV-2 RNA RESP QL NAA+PROBE: NOTDETECTED

## 2024-03-07 PROCEDURE — 86308 HETEROPHILE ANTIBODY SCREEN: CPT

## 2024-03-07 PROCEDURE — 99284 EMERGENCY DEPT VISIT MOD MDM: CPT

## 2024-03-07 PROCEDURE — 96374 THER/PROPH/DIAG INJ IV PUSH: CPT

## 2024-03-07 PROCEDURE — 700111 HCHG RX REV CODE 636 W/ 250 OVERRIDE (IP): Mod: UD | Performed by: EMERGENCY MEDICINE

## 2024-03-07 PROCEDURE — 96375 TX/PRO/DX INJ NEW DRUG ADDON: CPT

## 2024-03-07 PROCEDURE — 87651 STREP A DNA AMP PROBE: CPT

## 2024-03-07 PROCEDURE — 0241U HCHG SARS-COV-2 COVID-19 NFCT DS RESP RNA 4 TRGT ED POC: CPT

## 2024-03-07 RX ORDER — ONDANSETRON 2 MG/ML
4 INJECTION INTRAMUSCULAR; INTRAVENOUS ONCE
Status: COMPLETED | OUTPATIENT
Start: 2024-03-07 | End: 2024-03-07

## 2024-03-07 RX ORDER — KETOROLAC TROMETHAMINE 15 MG/ML
15 INJECTION, SOLUTION INTRAMUSCULAR; INTRAVENOUS ONCE
Status: COMPLETED | OUTPATIENT
Start: 2024-03-07 | End: 2024-03-07

## 2024-03-07 RX ORDER — DEXAMETHASONE SODIUM PHOSPHATE 4 MG/ML
12 INJECTION, SOLUTION INTRA-ARTICULAR; INTRALESIONAL; INTRAMUSCULAR; INTRAVENOUS; SOFT TISSUE ONCE
Status: COMPLETED | OUTPATIENT
Start: 2024-03-07 | End: 2024-03-07

## 2024-03-07 RX ORDER — KETOROLAC TROMETHAMINE 15 MG/ML
15 INJECTION, SOLUTION INTRAMUSCULAR; INTRAVENOUS ONCE
Status: DISCONTINUED | OUTPATIENT
Start: 2024-03-07 | End: 2024-03-07

## 2024-03-07 RX ADMIN — KETOROLAC TROMETHAMINE 15 MG: 15 INJECTION, SOLUTION INTRAMUSCULAR; INTRAVENOUS at 23:24

## 2024-03-07 RX ADMIN — DEXAMETHASONE SODIUM PHOSPHATE 12 MG: 4 INJECTION INTRA-ARTICULAR; INTRALESIONAL; INTRAMUSCULAR; INTRAVENOUS; SOFT TISSUE at 23:25

## 2024-03-07 RX ADMIN — ONDANSETRON 4 MG: 2 INJECTION INTRAMUSCULAR; INTRAVENOUS at 23:15

## 2024-03-08 VITALS
RESPIRATION RATE: 17 BRPM | BODY MASS INDEX: 37.46 KG/M2 | WEIGHT: 211.42 LBS | DIASTOLIC BLOOD PRESSURE: 58 MMHG | SYSTOLIC BLOOD PRESSURE: 115 MMHG | HEIGHT: 63 IN | HEART RATE: 100 BPM | OXYGEN SATURATION: 96 % | TEMPERATURE: 97.4 F

## 2024-03-08 PROCEDURE — 700111 HCHG RX REV CODE 636 W/ 250 OVERRIDE (IP): Mod: JZ,UD | Performed by: EMERGENCY MEDICINE

## 2024-03-08 RX ADMIN — PENICILLIN G BENZATHINE 1.2 MILLION UNITS: 2400000 INJECTION, SUSPENSION INTRAMUSCULAR at 01:30

## 2024-03-08 NOTE — DISCHARGE INSTRUCTIONS
Follow-up with primary care next week for reevaluation.      You have strep throat.  You were given an injection of antibiotics in the emergency department.  No further antibiotics are needed.    Tylenol or ibuprofen as needed for discomfort.    Encourage oral fluid hydration.  Diet and activity as tolerated.    Return the emergency department for persistent or worsening throat pain, difficulty swallowing or breathing, neck pain or stiffness, change in voice, fever, vomiting or other new concerns.

## 2024-03-08 NOTE — ED NOTES
Patient ambulated without assistance to room. Patient in gown, on monitor, with call light in reach. Chart up for next available ERP.

## 2024-03-08 NOTE — ED PROVIDER NOTES
"ED Provider Note    CHIEF COMPLAINT  Chief Complaint   Patient presents with    Sore Throat     Started last night. (+) nausea. Pt can't keep anything down. Able to manage secretions.         EXTERNAL RECORDS REVIEWED  Outpatient Notes urgent care 2/20/2024 for sinus problem, otalgia.  Dysfunction of right eustachian tube, bilateral impacted cerumen, sinusitis, chronic.  Start antihistamine, Flonase and topical decongestant    HPI/ROS  LIMITATION TO HISTORY   Select: : None  OUTSIDE HISTORIAN(S):  Significant other      Mariela Soni is a 23 y.o. adult who presents to the emergency department through triage with significant other for sore throat.  Symptoms for 24 hours.  Pain with swallow.  Decreased appetite.  Tactile fever.  Rare cough, no congestion, ear pain.  No vomiting or diarrhea.  Denies history of similar recurrent symptoms.  Denies sick contacts.    PAST MEDICAL HISTORY   Denies    SURGICAL HISTORY  patient denies any surgical history    FAMILY HISTORY  History reviewed. No pertinent family history.    SOCIAL HISTORY  Social History     Tobacco Use    Smoking status: Never    Smokeless tobacco: Never   Vaping Use    Vaping Use: Never used   Substance and Sexual Activity    Alcohol use: Yes     Comment: Rarely    Drug use: Yes     Types: Inhaled, Marijuana     Comment: occ    Sexual activity: Not on file       CURRENT MEDICATIONS  Home Medications       Reviewed by Milli Sanchez R.N. (Registered Nurse) on 03/07/24 at 214  Med List Status: Partial     Medication Last Dose Status   atenolol (TENORMIN) 25 MG Tab  Active   benzonatate (TESSALON) 100 MG Cap  Active   estradiol (VIVELLE DOT) 0.1 MG/24HR PATCH BIWEEKLY  Active   spironolactone (ALDACTONE) 100 MG Tab  Active   VRAYLAR 3 MG capsule  Active                    ALLERGIES  No Known Allergies    PHYSICAL EXAM  VITAL SIGNS: /57   Pulse (!) 109   Temp 36.9 °C (98.4 °F) (Temporal)   Resp 18   Ht 1.6 m (5' 3\")   Wt 95.9 kg (211 lb " 6.7 oz)   SpO2 96%   BMI 37.45 kg/m²    Pulse ox interpretation: I interpret this pulse ox as normal.  Constitutional: Alert in no apparent distress.  HENT: Normocephalic, atraumatic. Bilateral external ears normal, TMs clear bilaterally.  Nose normal. Moist mucous membranes.  Tonsils are enlarged but symmetric bilaterally, mild edema, diffuse erythema.  Uvula remains midline.  No exudate.  Tolerating secretions.  No stridor or dysphonia.  Eyes: Pupils are equal and reactive, Conjunctiva normal.   Neck: Normal range of motion, Supple.  No evidence of meningeal irritation.  Lymphatic: No lymphadenopathy noted.   Cardiovascular: Regular rate and rhythm, no murmurs. Distal pulses intact.    Thorax & Lungs: Normal breath sounds.  No wheezing/rales/ronchi. No increased work of breathing  Skin: Warm, Dry  Musculoskeletal: Good range of motion in all major joints.  Neurologic: Alert and orient x 4.  Speech clear and cohesive.  Psychiatric: Affect normal, Judgment normal, Mood normal.       DIAGNOSTIC STUDIES / PROCEDURES  LABS  Results for orders placed or performed during the hospital encounter of 03/07/24   Group A Strep by PCR    Specimen: Throat   Result Value Ref Range    Group A Strep by PCR DETECTED (A) Not Detected   MONONUCLEOSIS TEST QUAL   Result Value Ref Range    Heterophile Screen Negative Negative   POC CoV-2, FLU A/B, RSV by PCR   Result Value Ref Range    POC Influenza A RNA, PCR Negative Negative    POC Influenza B RNA, PCR Negative Negative    POC RSV, by PCR Negative Negative    POC SARS-CoV-2, PCR NotDetected          COURSE & MEDICAL DECISION MAKING    ED Observation Status? No; Patient does not meet criteria for ED Observation.     INITIAL ASSESSMENT, COURSE AND PLAN  Care Narrative:   ED evaluation does demonstrate strep tonsillitis/pharyngitis.  No clinical evidence for peritonsillar abscess as tonsils are symmetric bilaterally as his uvula.  No evidence for airway compromise.  Tolerating oral  fluids without difficulty.  Zofran, Decadron, Toradol given afternoon for general evaluation, patient then offered IM Bicillin before discharge.  Hemodynamically stable without fever, tachycardia, hypotension or hypoxia.      ADDITIONAL PROBLEM LIST    DISPOSITION AND DISCUSSIONS    Discussion of management with other QHP or appropriate source(s): None     Escalation of care considered, and ultimately not performed:blood analysis and diagnostic imaging    The patient is stable for discharge home, anticipatory guidance provided, Tylenol or ibuprofen as needed for discomfort, close follow-up is encouraged and strict return instructions have been discussed. Patient is agreeable to the disposition and plan.      FINAL DIAGNOSIS  1. Strep pharyngitis    2. Strep tonsillitis           Electronically signed by: Heidi Maxwell D.O., 3/8/2024 1:01 AM

## 2024-03-08 NOTE — ED NOTES
Pt discharged home. GCS 15. IV discontinued and gauze placed, pt in possession of belongings. Pt provided discharge education and information pertaining to follow up appointments. Pt received copy of discharge instructions and verbalized understanding.     Vitals:    03/08/24 0113   BP: 115/58   Pulse: 100   Resp: 17   Temp: 36.3 °C (97.4 °F)   SpO2: 96%

## 2024-03-08 NOTE — ED TRIAGE NOTES
Mariela Soni  23 y.o. adult  Chief Complaint   Patient presents with    Sore Throat     Started last night. (+) nausea. Pt can't keep anything down. Able to manage secretions.       (+) swollen tonsils, (+) lightheadedness.     Pt in a WC to triage for above complaint.     Pt is alert, oriented, and follows commands. Pt speaking in full sentences and responds appropriately to questions. No acute distress noted in triage and respirations are even and unlabored.     Pt placed in lobby and educated on triage process. Pt encouraged to alert staff for any changes in condition.    Vitals:    03/07/24 2132   BP: (!) 139/98   Pulse: (!) 129   Resp: 18   Temp: 36.9 °C (98.4 °F)   SpO2: 95%

## 2024-03-22 ENCOUNTER — OFFICE VISIT (OUTPATIENT)
Dept: URGENT CARE | Facility: PHYSICIAN GROUP | Age: 24
End: 2024-03-22
Payer: COMMERCIAL

## 2024-03-22 ENCOUNTER — HOSPITAL ENCOUNTER (OUTPATIENT)
Facility: MEDICAL CENTER | Age: 24
End: 2024-03-22
Attending: FAMILY MEDICINE
Payer: COMMERCIAL

## 2024-03-22 ENCOUNTER — HOSPITAL ENCOUNTER (EMERGENCY)
Facility: MEDICAL CENTER | Age: 24
End: 2024-03-22
Attending: EMERGENCY MEDICINE
Payer: COMMERCIAL

## 2024-03-22 ENCOUNTER — APPOINTMENT (OUTPATIENT)
Dept: RADIOLOGY | Facility: MEDICAL CENTER | Age: 24
End: 2024-03-22
Attending: EMERGENCY MEDICINE
Payer: COMMERCIAL

## 2024-03-22 VITALS
HEART RATE: 109 BPM | RESPIRATION RATE: 19 BRPM | SYSTOLIC BLOOD PRESSURE: 124 MMHG | HEIGHT: 63 IN | BODY MASS INDEX: 36.6 KG/M2 | DIASTOLIC BLOOD PRESSURE: 82 MMHG | OXYGEN SATURATION: 98 % | WEIGHT: 206.57 LBS | TEMPERATURE: 97.8 F

## 2024-03-22 VITALS
OXYGEN SATURATION: 95 % | HEART RATE: 134 BPM | TEMPERATURE: 100.2 F | RESPIRATION RATE: 16 BRPM | SYSTOLIC BLOOD PRESSURE: 128 MMHG | DIASTOLIC BLOOD PRESSURE: 84 MMHG | BODY MASS INDEX: 36.86 KG/M2 | WEIGHT: 208 LBS | HEIGHT: 63 IN

## 2024-03-22 DIAGNOSIS — E86.0 DEHYDRATION: ICD-10-CM

## 2024-03-22 DIAGNOSIS — J02.9 SORE THROAT: ICD-10-CM

## 2024-03-22 DIAGNOSIS — J03.90 EXUDATIVE TONSILLITIS: ICD-10-CM

## 2024-03-22 DIAGNOSIS — R53.83 MALAISE AND FATIGUE: ICD-10-CM

## 2024-03-22 DIAGNOSIS — R11.2 NAUSEA AND VOMITING, UNSPECIFIED VOMITING TYPE: ICD-10-CM

## 2024-03-22 DIAGNOSIS — R55 SYNCOPE, UNSPECIFIED SYNCOPE TYPE: ICD-10-CM

## 2024-03-22 DIAGNOSIS — R53.81 MALAISE AND FATIGUE: ICD-10-CM

## 2024-03-22 LAB
ALBUMIN SERPL BCP-MCNC: 4.1 G/DL (ref 3.2–4.9)
ALBUMIN/GLOB SERPL: 1.1 G/DL
ALP SERPL-CCNC: 72 U/L
ALT SERPL-CCNC: 24 U/L (ref 2–50)
ANION GAP SERPL CALC-SCNC: 13 MMOL/L (ref 7–16)
APPEARANCE UR: CLEAR
APPEARANCE UR: CLEAR
AST SERPL-CCNC: 21 U/L (ref 12–45)
BASOPHILS # BLD AUTO: 0.5 % (ref 0–1.8)
BASOPHILS # BLD: 0.08 K/UL (ref 0–0.12)
BILIRUB SERPL-MCNC: 0.9 MG/DL (ref 0.1–1.5)
BILIRUB UR QL STRIP.AUTO: NEGATIVE
BILIRUB UR STRIP-MCNC: NORMAL MG/DL
BUN SERPL-MCNC: 15 MG/DL (ref 8–22)
CALCIUM ALBUM COR SERPL-MCNC: 9.2 MG/DL (ref 8.5–10.5)
CALCIUM SERPL-MCNC: 9.3 MG/DL (ref 8.4–10.2)
CHLORIDE SERPL-SCNC: 99 MMOL/L (ref 96–112)
CO2 SERPL-SCNC: 23 MMOL/L (ref 20–33)
COLOR UR AUTO: NORMAL
COLOR UR: YELLOW
CREAT SERPL-MCNC: 0.91 MG/DL (ref 0.5–1.4)
EKG IMPRESSION: NORMAL
EOSINOPHIL # BLD AUTO: 0.07 K/UL (ref 0–0.51)
EOSINOPHIL NFR BLD: 0.4 % (ref 0–6.9)
ERYTHROCYTE [DISTWIDTH] IN BLOOD BY AUTOMATED COUNT: 42.5 FL (ref 35.9–50)
FLUAV RNA SPEC QL NAA+PROBE: NEGATIVE
FLUBV RNA SPEC QL NAA+PROBE: NEGATIVE
GFR SERPLBLD CREATININE-BSD FMLA CKD-EPI: 121 ML/MIN/1.73 M 2
GLOBULIN SER CALC-MCNC: 3.6 G/DL (ref 1.9–3.5)
GLUCOSE SERPL-MCNC: 94 MG/DL (ref 65–99)
GLUCOSE UR STRIP.AUTO-MCNC: NEGATIVE MG/DL
GLUCOSE UR STRIP.AUTO-MCNC: NEGATIVE MG/DL
HCT VFR BLD AUTO: 43 % (ref 42–52)
HGB BLD-MCNC: 14.4 G/DL (ref 14–18)
IMM GRANULOCYTES # BLD AUTO: 0.15 K/UL (ref 0–0.11)
IMM GRANULOCYTES NFR BLD AUTO: 1 % (ref 0–0.9)
KETONES UR STRIP.AUTO-MCNC: 15 MG/DL
KETONES UR STRIP.AUTO-MCNC: 40 MG/DL
LEUKOCYTE ESTERASE UR QL STRIP.AUTO: NEGATIVE
LEUKOCYTE ESTERASE UR QL STRIP.AUTO: NEGATIVE
LIPASE SERPL-CCNC: 24 U/L (ref 11–82)
LYMPHOCYTES # BLD AUTO: 2.22 K/UL (ref 1–4.8)
LYMPHOCYTES NFR BLD: 14.2 % (ref 22–41)
MCH RBC QN AUTO: 29.1 PG (ref 27–33)
MCHC RBC AUTO-ENTMCNC: 33.5 G/DL (ref 32.3–36.5)
MCV RBC AUTO: 87 FL (ref 81.4–97.8)
MICRO URNS: ABNORMAL
MONOCYTES # BLD AUTO: 2.07 K/UL (ref 0–0.85)
MONOCYTES NFR BLD AUTO: 13.3 % (ref 0–13.4)
NEUTROPHILS # BLD AUTO: 11.01 K/UL (ref 1.82–7.42)
NEUTROPHILS NFR BLD: 70.6 % (ref 44–72)
NITRITE UR QL STRIP.AUTO: NEGATIVE
NITRITE UR QL STRIP.AUTO: NEGATIVE
NRBC # BLD AUTO: 0 K/UL
NRBC BLD-RTO: 0 /100 WBC (ref 0–0.2)
PH UR STRIP.AUTO: 6 [PH] (ref 5–8)
PH UR STRIP.AUTO: 6.5 [PH] (ref 5–8)
PLATELET # BLD AUTO: 323 K/UL (ref 164–446)
PMV BLD AUTO: 8.8 FL (ref 9–12.9)
POCT INT CON NEG: NEGATIVE
POCT INT CON POS: POSITIVE
POCT URINE PREGNANCY TEST: NEGATIVE
POTASSIUM SERPL-SCNC: 4.1 MMOL/L (ref 3.6–5.5)
PROT SERPL-MCNC: 7.7 G/DL (ref 6–8.2)
PROT UR QL STRIP: 30 MG/DL
PROT UR QL STRIP: NEGATIVE MG/DL
RBC # BLD AUTO: 4.94 M/UL (ref 4.7–6.1)
RBC UR QL AUTO: NEGATIVE
RBC UR QL AUTO: NORMAL
RSV RNA SPEC QL NAA+PROBE: NEGATIVE
S PYO DNA SPEC NAA+PROBE: NOT DETECTED
SARS-COV-2 RNA RESP QL NAA+PROBE: NEGATIVE
SODIUM SERPL-SCNC: 135 MMOL/L (ref 135–145)
SP GR UR STRIP.AUTO: 1.02
SP GR UR STRIP.AUTO: <=1.005
TROPONIN T SERPL-MCNC: <6 NG/L (ref 6–19)
UROBILINOGEN UR STRIP-MCNC: 0.2 MG/DL
WBC # BLD AUTO: 15.6 K/UL (ref 4.8–10.8)

## 2024-03-22 PROCEDURE — 93005 ELECTROCARDIOGRAM TRACING: CPT

## 2024-03-22 PROCEDURE — 700111 HCHG RX REV CODE 636 W/ 250 OVERRIDE (IP): Mod: JZ | Performed by: EMERGENCY MEDICINE

## 2024-03-22 PROCEDURE — 0241U POCT CEPHEID COV-2, FLU A/B, RSV - PCR: CPT | Performed by: FAMILY MEDICINE

## 2024-03-22 PROCEDURE — 99285 EMERGENCY DEPT VISIT HI MDM: CPT

## 2024-03-22 PROCEDURE — 74177 CT ABD & PELVIS W/CONTRAST: CPT

## 2024-03-22 PROCEDURE — 99214 OFFICE O/P EST MOD 30 MIN: CPT | Performed by: FAMILY MEDICINE

## 2024-03-22 PROCEDURE — 84484 ASSAY OF TROPONIN QUANT: CPT

## 2024-03-22 PROCEDURE — 36415 COLL VENOUS BLD VENIPUNCTURE: CPT

## 2024-03-22 PROCEDURE — 3079F DIAST BP 80-89 MM HG: CPT | Performed by: FAMILY MEDICINE

## 2024-03-22 PROCEDURE — 700105 HCHG RX REV CODE 258: Performed by: EMERGENCY MEDICINE

## 2024-03-22 PROCEDURE — 80053 COMPREHEN METABOLIC PANEL: CPT

## 2024-03-22 PROCEDURE — 81025 URINE PREGNANCY TEST: CPT | Performed by: FAMILY MEDICINE

## 2024-03-22 PROCEDURE — 3074F SYST BP LT 130 MM HG: CPT | Performed by: FAMILY MEDICINE

## 2024-03-22 PROCEDURE — 85025 COMPLETE CBC W/AUTO DIFF WBC: CPT

## 2024-03-22 PROCEDURE — 83690 ASSAY OF LIPASE: CPT

## 2024-03-22 PROCEDURE — 81002 URINALYSIS NONAUTO W/O SCOPE: CPT | Performed by: FAMILY MEDICINE

## 2024-03-22 PROCEDURE — 700117 HCHG RX CONTRAST REV CODE 255: Performed by: EMERGENCY MEDICINE

## 2024-03-22 PROCEDURE — 71045 X-RAY EXAM CHEST 1 VIEW: CPT

## 2024-03-22 PROCEDURE — 93005 ELECTROCARDIOGRAM TRACING: CPT | Performed by: EMERGENCY MEDICINE

## 2024-03-22 PROCEDURE — 81003 URINALYSIS AUTO W/O SCOPE: CPT

## 2024-03-22 PROCEDURE — 87651 STREP A DNA AMP PROBE: CPT | Performed by: FAMILY MEDICINE

## 2024-03-22 PROCEDURE — 96374 THER/PROPH/DIAG INJ IV PUSH: CPT

## 2024-03-22 PROCEDURE — 87070 CULTURE OTHR SPECIMN AEROBIC: CPT

## 2024-03-22 RX ORDER — MORPHINE SULFATE 4 MG/ML
4 INJECTION INTRAVENOUS ONCE
Status: COMPLETED | OUTPATIENT
Start: 2024-03-22 | End: 2024-03-22

## 2024-03-22 RX ORDER — IBUPROFEN 200 MG
600 TABLET ORAL ONCE
Status: COMPLETED | OUTPATIENT
Start: 2024-03-22 | End: 2024-03-22

## 2024-03-22 RX ORDER — SODIUM CHLORIDE 9 MG/ML
1000 INJECTION, SOLUTION INTRAVENOUS ONCE
Status: COMPLETED | OUTPATIENT
Start: 2024-03-22 | End: 2024-03-22

## 2024-03-22 RX ORDER — IBUPROFEN 600 MG/1
600 TABLET ORAL EVERY 6 HOURS PRN
Status: SHIPPED | COMMUNITY
End: 2024-03-24

## 2024-03-22 RX ORDER — AMOXICILLIN 500 MG/1
500 CAPSULE ORAL 3 TIMES DAILY
Status: ON HOLD | COMMUNITY
Start: 2024-03-12 | End: 2024-03-25

## 2024-03-22 RX ORDER — ACETAMINOPHEN 500 MG
1000 TABLET ORAL ONCE
Status: COMPLETED | OUTPATIENT
Start: 2024-03-22 | End: 2024-03-22

## 2024-03-22 RX ORDER — ONDANSETRON 4 MG/1
4 TABLET, ORALLY DISINTEGRATING ORAL EVERY 6 HOURS PRN
Qty: 10 TABLET | Refills: 0 | Status: SHIPPED | OUTPATIENT
Start: 2024-03-22 | End: 2024-04-01

## 2024-03-22 RX ADMIN — Medication 1000 MG: at 13:09

## 2024-03-22 RX ADMIN — Medication 600 MG: at 13:08

## 2024-03-22 RX ADMIN — IOHEXOL 100 ML: 350 INJECTION, SOLUTION INTRAVENOUS at 13:45

## 2024-03-22 RX ADMIN — SODIUM CHLORIDE 1000 ML: 9 INJECTION, SOLUTION INTRAVENOUS at 15:19

## 2024-03-22 RX ADMIN — MORPHINE SULFATE 4 MG: 4 INJECTION, SOLUTION INTRAMUSCULAR; INTRAVENOUS at 18:19

## 2024-03-22 RX ADMIN — SODIUM CHLORIDE 1000 ML: 9 INJECTION, SOLUTION INTRAVENOUS at 19:30

## 2024-03-22 ASSESSMENT — ENCOUNTER SYMPTOMS
COUGH: 1
DIZZINESS: 1
SORE THROAT: 1
VOMITING: 1
FEVER: 1

## 2024-03-22 ASSESSMENT — PAIN DESCRIPTION - PAIN TYPE: TYPE: ACUTE PAIN

## 2024-03-22 NOTE — PROGRESS NOTES
"Subjective     Mariela Olivia Soni is a 23 y.o. adult who presents with Pharyngitis (Passed out last night at work, vomiting )    - This is a very pleasant 23 y.o. who has come to the walk-in clinic today for since yesterday feeling weak tired body aches all over sore throat coughing nasal congestion.  States yesterday had around 4 episodes of nonbloody nausea vomiting.  Has not had much to eat or drink since yesterday.  States at work she was feeling very weak and this caused her to fall once but was able to get back up and continue working but then later on her shift also fell due to weakness and thinks may have passed out cannot remember the event and says EMTs were called.    Today still has ongoing body aches malaise sore throat coughing nasal congestion feeling very weak dizzy tired not able to eat or drink much.  Also reports some generalized abdominal tenderness is not sure if it is from a vomiting yesterday soreness or from being hungry          ALLERGIES:  Patient has no known allergies.     PMH:  History reviewed. No pertinent past medical history.     PSH:  History reviewed. No pertinent surgical history.    MEDS:    Current Outpatient Medications:     atenolol (TENORMIN) 25 MG Tab, Take 25 mg by mouth 2 times a day as needed., Disp: , Rfl:     ** I have documented what I find to be significant in regards to past medical, social, family and surgical history  in my HPI or under PMH/PSH/FH review section, otherwise it is noncontributory **           HPI    Review of Systems   Constitutional:  Positive for fever and malaise/fatigue.   HENT:  Positive for congestion and sore throat.    Respiratory:  Positive for cough.    Gastrointestinal:  Positive for vomiting.   Neurological:  Positive for dizziness.   All other systems reviewed and are negative.             Objective     /84   Pulse (!) 134   Temp 37.9 °C (100.2 °F) (Temporal)   Resp 16   Ht 1.6 m (5' 3\")   Wt 94.3 kg (208 lb)   SpO2 95% "   BMI 36.85 kg/m²      Physical Exam  Vitals and nursing note reviewed.   Constitutional:       General: She is not in acute distress.     Appearance: Normal appearance. She is well-developed. She is ill-appearing. She is not toxic-appearing or diaphoretic.   HENT:      Head: Normocephalic.      Mouth/Throat:      Mouth: Mucous membranes are moist.      Pharynx: Oropharyngeal exudate and posterior oropharyngeal erythema present.   Cardiovascular:      Rate and Rhythm: Regular rhythm. Tachycardia present.      Heart sounds: Normal heart sounds. No murmur heard.  Pulmonary:      Effort: Pulmonary effort is normal. No respiratory distress.      Breath sounds: Normal breath sounds. No rhonchi or rales.   Abdominal:      General: Bowel sounds are normal. There is no distension.      Palpations: Abdomen is soft.      Tenderness: There is abdominal tenderness. There is no guarding or rebound.   Musculoskeletal:      Cervical back: Tenderness present.   Lymphadenopathy:      Cervical: Cervical adenopathy present.   Neurological:      Mental Status: She is alert.      Motor: No abnormal muscle tone.   Psychiatric:         Mood and Affect: Mood normal.         Behavior: Behavior normal.                             Assessment & Plan     1. Syncope, unspecified syncope type  POCT Urinalysis    POCT Pregnancy      2. Sore throat  POCT GROUP A STREP, PCR    POCT CoV-2, Flu A/B, RSV by PCR    ibuprofen (Motrin) tablet 600 mg    acetaminophen (Tylenol) tablet 1,000 mg      3. Exudative tonsillitis        4. Malaise and fatigue        5. Nausea and vomiting, unspecified vomiting type            - Dx, plan & d/c instructions discussed (asked to go to ER today for further eval)

## 2024-03-22 NOTE — ED NOTES
Med Rec completed per patient   Allergies reviewed    Patient completed a 5 day course of Amoxicillin on 3/17/2024     Patient is not taking anticoagulants

## 2024-03-22 NOTE — ED TRIAGE NOTES
"Chief Complaint   Patient presents with    Syncope     Passed out at work last night   PT states that he went to urgent care today where they discovered their heart rate was elevated and was referred to ER.       Abdominal Pain        Pt states that they did have nausea and vomiting that started yesterday prior to work and has prevent them from keeping anything down.      /68   Pulse (!) 118   Resp 17   Ht 1.6 m (5' 3\")   Wt 93.7 kg (206 lb 9.1 oz)   SpO2 93%   BMI 36.59 kg/m²     "

## 2024-03-22 NOTE — ED PROVIDER NOTES
Emergency Physician Note    Chief Concern:  Chief Complaint   Patient presents with    Syncope     Passed out at work last night   PT states that he went to urgent care today where they discovered their heart rate was elevated and was referred to ER.       Abdominal Pain        Pt states that they did have nausea and vomiting that started yesterday prior to work and has prevent them from keeping anything down.      HPI/ROS   Outside Historians:   Partner at bedside provides additional history.     External Records Reviewed:  Outpatient clinic note reviewed: Patient was seen in urgent care clinic earlier today, family medicine physician note reviewed from that visit.  She was seen for evaluation of bodyaches, sore throat, nasal congestion.  Yesterday she did had some nausea and vomiting.  She thinks she may have passed out at work yesterday, and paramedics were called.  At urgent care she reported fatigue, and some generalized abdominal soreness.  She was sent to the emergency department for further evaluation.    HPI:  Mariela Soni is a 23 y.o. adult who presents to the emergency department today after multiple syncopal episodes yesterday, as well as nausea and vomiting.  She reports multiple episodes of syncope yesterday, after nausea and vomiting throughout the day.  She reports that paramedics were activated, however they did not recommend transport at that time.  They did note that she had an elevated heart rate in the 130s.  Throughout the day today she has not been able to drink any fluids, nor eat due to nausea.  She also reports some generalized abdominal pain, as well as some pain radiating towards the chest.  She reports no thoracic back pain.  She does report a history of bicuspid aortic valve, otherwise no significant past medical history.  She does feel lightheaded, though is not had any syncopal episodes today, does have a mild headache, does not believe she struck her head when she fell  "yesterday.  She has no associated fevers, though she has had some flulike symptoms including sore throat, congestion, and bodyaches.  She was seen at urgent care prior to arrival, and had a COVID, influenza, and RSV PCR test sent, however has not yet gotten the results of that test.    PAST MEDICAL HISTORY  History reviewed. No pertinent past medical history.    SURGICAL HISTORY  History reviewed. No pertinent surgical history.    FAMILY HISTORY  History reviewed. No pertinent family history.    SOCIAL HISTORY   reports that she has never smoked. She has never used smokeless tobacco. She reports current alcohol use. She reports that she does not currently use drugs after having used the following drugs: Inhaled and Marijuana.    CURRENT MEDICATIONS  Previous Medications    AMOXICILLIN (AMOXIL) 500 MG CAP    Take 500 mg by mouth 3 times a day. 5 day course    ATENOLOL (TENORMIN) 25 MG TAB    Take 25 mg by mouth 2 times a day as needed.    IBUPROFEN (MOTRIN) 600 MG TAB    Take 600 mg by mouth every 6 hours as needed. Indications: Pain       ALLERGIES  Patient has no known allergies.    PHYSICAL EXAM  Vital Signs: /82   Pulse (!) 109   Temp 36.6 °C (97.8 °F) (Temporal)   Resp 19   Ht 1.6 m (5' 3\")   Wt 93.7 kg (206 lb 9.1 oz)   SpO2 98%   BMI 36.59 kg/m²   Constitutional: Alert, no acute distress  HENT: Normocephalic, atraumatic.  Cardiovascular: No tachycardia, regular rate and rhythm, no murmur appreciated  Pulmonary: No respiratory distress, normal work of breathing breath sounds clear and equal bilaterally, no wheezing, no coarse breath sounds  Abdomen: Soft, nondistended, mild discomfort on abdominal palpation, no peritoneal signs, no rebound, no guarding  Skin: Warm, dry, no rashes or lesions  Musculoskeletal: Normal range of motion in all extremities, no swelling or deformity noted  Neurologic: Alert, oriented, normal motor function, no speech deficits    Diagnostic Studies & " Procedures    Labs:  All labs reviewed by me as noted below.    EK Lead EKG interpreted by me as noted below  Results for orders placed or performed during the hospital encounter of 24   EKG   Result Value Ref Range    Report       [facility] Emergency Dept.    Test Date:  2024  Pt Name:    CORTNEY SONI               Department: [facility]  MRN:        [id number]                      Room:  Gender:     Female                       Technician: [id number]  :        2000                   Requested By:ER TRIAGE PROTOCOL  Order #:    [id number]                    Reading MD: CIRO ANG MD    Measurements  Intervals                                Axis  Rate:       117                          P:          60  AZ:         137                          QRS:        42  QRSD:       82                           T:          9  QT:         298  QTc:        416    Interpretive Statements  Rate 117, sinus tachycardia, appearance consistent with benign early  repolarization in V2, no ST depressions, no pathologic T wave inversions, no  convex T wave morphology.  Electronically Signed On 2024 17:47:52 PDT by CIRO ANG MD         Radiology:  The attending Emergency Physician has independently interpreted the following imaging:  I independently interpreted the plain film of the chest, no lower lobe infiltrate to suggest pneumonia.    CT-ABDOMEN-PELVIS WITH   Final Result      1.  No acute normality of the abdomen or pelvis.   2.  Hepatic steatosis.         DX-CHEST-PORTABLE (1 VIEW)   Final Result      No evidence of acute cardiopulmonary process.          Course and Medical Decision Making    Initial Assessment and Plan:  Ms. Soni presents to the emergency department today for evaluation of multiple syncopal episodes, as well as nausea and vomiting.  She is tachycardic on arrival to the emergency department, is not having active vomiting at this time.  She is afebrile with no  hypotension.  On my initial assessment, heart rate is in the 110s.  She does have some abdominal discomfort, but no significant tenderness to palpation or localizable tenderness to palpation.  She reports flulike symptoms, reviewed the results from urgent care earlier today, she is negative for group A strep, negative for influenza, COVID-19, and RSV.    On laboratory evaluation CMP is entirely within normal limits, lipase and troponin are not elevated.  White blood count is elevated to 15.6 with immature granulocytes just above normal reference range. Urinalysis is negative for evidence of infection, positive for ketones consistent with dehydration.    CT of the abdomen and pelvis demonstrates no acute abnormality.  Hepatic steatosis present.    Her pain was treated with a dose of IV morphine.  She was also given IV fluids for treatment of tachycardia.  On reassessment after receiving these interventions heart rate has not yet normalized.  Second dose of IV fluids were administered.  Patient states she usually has a high heart rate, which is been attributed to anxiety in the past.  States that her heart rate is often up around 110.  I reviewed her prior emergency department visit from 3/7/2024, heart rate was 109, urgent care visit from 2/20/2024 notes heart rate of 107, urgent care visit from 2/3/24 with heart rate of 118.  This is consistent with patient's reported history of ongoing tachycardia.  Otherwise her symptoms are improving, she continues to have no nausea or vomiting.  Plan at this time is for discharge home with close outpatient follow-up.  She will call her primary care clinic the opening of business hours. Return precautions were discussed with the patient, and provided in written form with the patient's discharge instructions.     Additional Problems and Disposition    Additional problems addressed:   Tachycardia - tachycardic rate is chronic, documented on several prior visits and known to the  patient, no indication for further intervention    Escalation of care considered, and ultimately not performed:   1.  Hospitalization -hospitalization was considered based on vital signs and presentation, however patient remained well in the emergency department with no ongoing vomiting, CT imaging is reassuring, believe leukocytosis is likely from ongoing vomiting yesterday.  At this time, there is no indication for inpatient diagnostics nor treatment, patient may be safely discharged with close outpatient follow-up and return precautions.    Prescription medication considerations and management:  1.  Zofran prescribed    Disposition:  Discharged in stable condition    FINAL IMPRESSION   1. Dehydration        2. Nausea and vomiting, unspecified vomiting type  ondansetron (ZOFRAN ODT) 4 MG TABLET DISPERSIBLE        FOLLOW UP:  RAFAEL Hebert  [address]  [facility] [address]  [place] [id number] [phone]    Schedule an appointment as soon as possible for a visit in 2 days      [facility], Emergency Dept  [address]  [facility] [address] [phone]  Go to   If symptoms worsen      OUTPATIENT MEDICATIONS:  New Prescriptions    ONDANSETRON (ZOFRAN ODT) 4 MG TABLET DISPERSIBLE    Take 1 Tablet by mouth every 6 hours as needed for Nausea/Vomiting for up to 10 days.

## 2024-03-23 NOTE — ED NOTES
Pt provided with DC paper work and follow up care instructions. Pt educated on new medications with no questions. Pt to ambulate out of ER with ride home.

## 2024-03-23 NOTE — DISCHARGE INSTRUCTIONS
Please follow up with your primary care physician in 24-48 hours for abdominal recheck if your symptoms have not completely gone away. Call your primary care physician at the opening of business hours to let them know you were seen in the emergency department. Return immediately if your pain returns or worsens, if you develop any new symptoms, if you are not able to drink fluids, if you have persistent vomiting, if you develop fevers, or if you have any further concerns. Additionally, please return if your symptoms have not resolved and you are unable to follow up with your primary care physician for recheck.

## 2024-03-24 ENCOUNTER — HOSPITAL ENCOUNTER (INPATIENT)
Facility: MEDICAL CENTER | Age: 24
LOS: 1 days | End: 2024-03-25
Attending: EMERGENCY MEDICINE | Admitting: STUDENT IN AN ORGANIZED HEALTH CARE EDUCATION/TRAINING PROGRAM
Payer: COMMERCIAL

## 2024-03-24 ENCOUNTER — APPOINTMENT (OUTPATIENT)
Dept: RADIOLOGY | Facility: MEDICAL CENTER | Age: 24
End: 2024-03-24
Attending: EMERGENCY MEDICINE
Payer: COMMERCIAL

## 2024-03-24 DIAGNOSIS — J03.90 TONSILLITIS: ICD-10-CM

## 2024-03-24 PROBLEM — E66.812 CLASS 2 OBESITY DUE TO EXCESS CALORIES IN ADULT: Status: ACTIVE | Noted: 2024-03-24

## 2024-03-24 PROBLEM — E66.09 CLASS 2 OBESITY DUE TO EXCESS CALORIES IN ADULT: Status: ACTIVE | Noted: 2024-03-24

## 2024-03-24 PROBLEM — A41.9 SEPSIS (HCC): Status: ACTIVE | Noted: 2024-03-24

## 2024-03-24 PROBLEM — R03.0 ELEVATED BLOOD PRESSURE READING WITHOUT DIAGNOSIS OF HYPERTENSION: Status: ACTIVE | Noted: 2024-03-24

## 2024-03-24 LAB
ALBUMIN SERPL BCP-MCNC: 4.2 G/DL (ref 3.2–4.9)
ALBUMIN/GLOB SERPL: 1.1 G/DL
ALP SERPL-CCNC: 72 U/L
ALT SERPL-CCNC: 27 U/L (ref 2–50)
ANION GAP SERPL CALC-SCNC: 18 MMOL/L (ref 7–16)
AST SERPL-CCNC: 27 U/L (ref 12–45)
BACTERIA SPEC RESP CULT: NORMAL
BASOPHILS # BLD AUTO: 0.4 % (ref 0–1.8)
BASOPHILS # BLD: 0.06 K/UL (ref 0–0.12)
BILIRUB SERPL-MCNC: 0.5 MG/DL (ref 0.1–1.5)
BUN SERPL-MCNC: 9 MG/DL (ref 8–22)
CALCIUM ALBUM COR SERPL-MCNC: 8.8 MG/DL (ref 8.5–10.5)
CALCIUM SERPL-MCNC: 9 MG/DL (ref 8.5–10.5)
CHLORIDE SERPL-SCNC: 96 MMOL/L (ref 96–112)
CO2 SERPL-SCNC: 19 MMOL/L (ref 20–33)
CREAT SERPL-MCNC: 0.98 MG/DL (ref 0.5–1.4)
EKG IMPRESSION: NORMAL
EOSINOPHIL # BLD AUTO: 0 K/UL (ref 0–0.51)
EOSINOPHIL NFR BLD: 0 % (ref 0–6.9)
ERYTHROCYTE [DISTWIDTH] IN BLOOD BY AUTOMATED COUNT: 41.4 FL (ref 35.9–50)
FLUAV RNA SPEC QL NAA+PROBE: NEGATIVE
FLUBV RNA SPEC QL NAA+PROBE: NEGATIVE
GFR SERPLBLD CREATININE-BSD FMLA CKD-EPI: 111 ML/MIN/1.73 M 2
GLOBULIN SER CALC-MCNC: 3.8 G/DL (ref 1.9–3.5)
GLUCOSE SERPL-MCNC: 108 MG/DL (ref 65–99)
HCT VFR BLD AUTO: 44.5 % (ref 42–52)
HETEROPH AB SER QL: NEGATIVE
HGB BLD-MCNC: 14.7 G/DL (ref 14–18)
IMM GRANULOCYTES # BLD AUTO: 0.11 K/UL (ref 0–0.11)
IMM GRANULOCYTES NFR BLD AUTO: 0.7 % (ref 0–0.9)
LACTATE SERPL-SCNC: 1 MMOL/L (ref 0.5–2)
LIPASE SERPL-CCNC: 29 U/L (ref 11–82)
LYMPHOCYTES # BLD AUTO: 2.44 K/UL (ref 1–4.8)
LYMPHOCYTES NFR BLD: 15.2 % (ref 22–41)
MCH RBC QN AUTO: 28.5 PG (ref 27–33)
MCHC RBC AUTO-ENTMCNC: 33 G/DL (ref 32.3–36.5)
MCV RBC AUTO: 86.4 FL (ref 81.4–97.8)
MONOCYTES # BLD AUTO: 2.26 K/UL (ref 0–0.85)
MONOCYTES NFR BLD AUTO: 14.1 % (ref 0–13.4)
NEUTROPHILS # BLD AUTO: 11.19 K/UL (ref 1.82–7.42)
NEUTROPHILS NFR BLD: 69.6 % (ref 44–72)
NRBC # BLD AUTO: 0 K/UL
NRBC BLD-RTO: 0 /100 WBC (ref 0–0.2)
PLATELET # BLD AUTO: 345 K/UL (ref 164–446)
PMV BLD AUTO: 8.9 FL (ref 9–12.9)
POTASSIUM SERPL-SCNC: 3.9 MMOL/L (ref 3.6–5.5)
PROT SERPL-MCNC: 8 G/DL (ref 6–8.2)
RBC # BLD AUTO: 5.15 M/UL (ref 4.7–6.1)
RSV RNA SPEC QL NAA+PROBE: NEGATIVE
S PYO DNA SPEC NAA+PROBE: NOT DETECTED
SARS-COV-2 RNA RESP QL NAA+PROBE: NOTDETECTED
SIGNIFICANT IND 70042: NORMAL
SITE SITE: NORMAL
SODIUM SERPL-SCNC: 133 MMOL/L (ref 135–145)
SOURCE SOURCE: NORMAL
WBC # BLD AUTO: 16.1 K/UL (ref 4.8–10.8)

## 2024-03-24 PROCEDURE — 83690 ASSAY OF LIPASE: CPT

## 2024-03-24 PROCEDURE — 700105 HCHG RX REV CODE 258: Performed by: STUDENT IN AN ORGANIZED HEALTH CARE EDUCATION/TRAINING PROGRAM

## 2024-03-24 PROCEDURE — 87040 BLOOD CULTURE FOR BACTERIA: CPT | Mod: 91

## 2024-03-24 PROCEDURE — 36415 COLL VENOUS BLD VENIPUNCTURE: CPT

## 2024-03-24 PROCEDURE — 99285 EMERGENCY DEPT VISIT HI MDM: CPT

## 2024-03-24 PROCEDURE — 83605 ASSAY OF LACTIC ACID: CPT

## 2024-03-24 PROCEDURE — 99223 1ST HOSP IP/OBS HIGH 75: CPT | Performed by: STUDENT IN AN ORGANIZED HEALTH CARE EDUCATION/TRAINING PROGRAM

## 2024-03-24 PROCEDURE — 0241U HCHG SARS-COV-2 COVID-19 NFCT DS RESP RNA 4 TRGT ED POC: CPT

## 2024-03-24 PROCEDURE — 700111 HCHG RX REV CODE 636 W/ 250 OVERRIDE (IP): Performed by: EMERGENCY MEDICINE

## 2024-03-24 PROCEDURE — 93005 ELECTROCARDIOGRAM TRACING: CPT | Performed by: EMERGENCY MEDICINE

## 2024-03-24 PROCEDURE — 87493 C DIFF AMPLIFIED PROBE: CPT

## 2024-03-24 PROCEDURE — 700117 HCHG RX CONTRAST REV CODE 255: Performed by: EMERGENCY MEDICINE

## 2024-03-24 PROCEDURE — 87150 DNA/RNA AMPLIFIED PROBE: CPT

## 2024-03-24 PROCEDURE — 700111 HCHG RX REV CODE 636 W/ 250 OVERRIDE (IP): Mod: JZ | Performed by: STUDENT IN AN ORGANIZED HEALTH CARE EDUCATION/TRAINING PROGRAM

## 2024-03-24 PROCEDURE — 700102 HCHG RX REV CODE 250 W/ 637 OVERRIDE(OP): Performed by: STUDENT IN AN ORGANIZED HEALTH CARE EDUCATION/TRAINING PROGRAM

## 2024-03-24 PROCEDURE — 96374 THER/PROPH/DIAG INJ IV PUSH: CPT

## 2024-03-24 PROCEDURE — 87651 STREP A DNA AMP PROBE: CPT

## 2024-03-24 PROCEDURE — 700105 HCHG RX REV CODE 258: Mod: UD | Performed by: EMERGENCY MEDICINE

## 2024-03-24 PROCEDURE — 87077 CULTURE AEROBIC IDENTIFY: CPT

## 2024-03-24 PROCEDURE — 85025 COMPLETE CBC W/AUTO DIFF WBC: CPT

## 2024-03-24 PROCEDURE — A9270 NON-COVERED ITEM OR SERVICE: HCPCS | Performed by: STUDENT IN AN ORGANIZED HEALTH CARE EDUCATION/TRAINING PROGRAM

## 2024-03-24 PROCEDURE — 80053 COMPREHEN METABOLIC PANEL: CPT

## 2024-03-24 PROCEDURE — 86308 HETEROPHILE ANTIBODY SCREEN: CPT

## 2024-03-24 PROCEDURE — 96375 TX/PRO/DX INJ NEW DRUG ADDON: CPT

## 2024-03-24 PROCEDURE — 70491 CT SOFT TISSUE NECK W/DYE: CPT

## 2024-03-24 PROCEDURE — 770001 HCHG ROOM/CARE - MED/SURG/GYN PRIV*

## 2024-03-24 RX ORDER — ONDANSETRON 4 MG/1
4 TABLET, ORALLY DISINTEGRATING ORAL EVERY 4 HOURS PRN
Status: DISCONTINUED | OUTPATIENT
Start: 2024-03-24 | End: 2024-03-25 | Stop reason: HOSPADM

## 2024-03-24 RX ORDER — ACETAMINOPHEN 325 MG/1
650 TABLET ORAL EVERY 6 HOURS PRN
Status: DISCONTINUED | OUTPATIENT
Start: 2024-03-24 | End: 2024-03-25 | Stop reason: HOSPADM

## 2024-03-24 RX ORDER — KETOROLAC TROMETHAMINE 15 MG/ML
15 INJECTION, SOLUTION INTRAMUSCULAR; INTRAVENOUS ONCE
Status: COMPLETED | OUTPATIENT
Start: 2024-03-24 | End: 2024-03-24

## 2024-03-24 RX ORDER — AMOXICILLIN 250 MG
2 CAPSULE ORAL EVERY EVENING
Status: DISCONTINUED | OUTPATIENT
Start: 2024-03-24 | End: 2024-03-24

## 2024-03-24 RX ORDER — DEXAMETHASONE SODIUM PHOSPHATE 4 MG/ML
4 INJECTION, SOLUTION INTRA-ARTICULAR; INTRALESIONAL; INTRAMUSCULAR; INTRAVENOUS; SOFT TISSUE EVERY 12 HOURS
Qty: 4 ML | Refills: 0 | Status: DISCONTINUED | OUTPATIENT
Start: 2024-03-25 | End: 2024-03-25 | Stop reason: HOSPADM

## 2024-03-24 RX ORDER — ONDANSETRON 2 MG/ML
4 INJECTION INTRAMUSCULAR; INTRAVENOUS EVERY 4 HOURS PRN
Status: DISCONTINUED | OUTPATIENT
Start: 2024-03-24 | End: 2024-03-25 | Stop reason: HOSPADM

## 2024-03-24 RX ORDER — SODIUM CHLORIDE, SODIUM LACTATE, POTASSIUM CHLORIDE, AND CALCIUM CHLORIDE .6; .31; .03; .02 G/100ML; G/100ML; G/100ML; G/100ML
30 INJECTION, SOLUTION INTRAVENOUS ONCE
Status: COMPLETED | OUTPATIENT
Start: 2024-03-24 | End: 2024-03-24

## 2024-03-24 RX ORDER — DEXAMETHASONE SODIUM PHOSPHATE 4 MG/ML
10 INJECTION, SOLUTION INTRA-ARTICULAR; INTRALESIONAL; INTRAMUSCULAR; INTRAVENOUS; SOFT TISSUE ONCE
Status: COMPLETED | OUTPATIENT
Start: 2024-03-24 | End: 2024-03-24

## 2024-03-24 RX ORDER — CEFTRIAXONE 1 G/1
1000 INJECTION, POWDER, FOR SOLUTION INTRAMUSCULAR; INTRAVENOUS ONCE
Status: COMPLETED | OUTPATIENT
Start: 2024-03-24 | End: 2024-03-24

## 2024-03-24 RX ORDER — ONDANSETRON 2 MG/ML
4 INJECTION INTRAMUSCULAR; INTRAVENOUS ONCE
Status: COMPLETED | OUTPATIENT
Start: 2024-03-24 | End: 2024-03-24

## 2024-03-24 RX ORDER — LABETALOL HYDROCHLORIDE 5 MG/ML
10 INJECTION, SOLUTION INTRAVENOUS EVERY 4 HOURS PRN
Status: DISCONTINUED | OUTPATIENT
Start: 2024-03-24 | End: 2024-03-25 | Stop reason: HOSPADM

## 2024-03-24 RX ORDER — PROMETHAZINE HYDROCHLORIDE 25 MG/1
12.5-25 SUPPOSITORY RECTAL EVERY 4 HOURS PRN
Status: DISCONTINUED | OUTPATIENT
Start: 2024-03-24 | End: 2024-03-25 | Stop reason: HOSPADM

## 2024-03-24 RX ORDER — SODIUM CHLORIDE, SODIUM LACTATE, POTASSIUM CHLORIDE, CALCIUM CHLORIDE 600; 310; 30; 20 MG/100ML; MG/100ML; MG/100ML; MG/100ML
INJECTION, SOLUTION INTRAVENOUS CONTINUOUS
Status: ACTIVE | OUTPATIENT
Start: 2024-03-24 | End: 2024-03-24

## 2024-03-24 RX ORDER — PROCHLORPERAZINE EDISYLATE 5 MG/ML
5-10 INJECTION INTRAMUSCULAR; INTRAVENOUS EVERY 4 HOURS PRN
Status: DISCONTINUED | OUTPATIENT
Start: 2024-03-24 | End: 2024-03-25 | Stop reason: HOSPADM

## 2024-03-24 RX ORDER — ENOXAPARIN SODIUM 100 MG/ML
40 INJECTION SUBCUTANEOUS DAILY
Status: DISCONTINUED | OUTPATIENT
Start: 2024-03-25 | End: 2024-03-25 | Stop reason: HOSPADM

## 2024-03-24 RX ORDER — PROMETHAZINE HYDROCHLORIDE 25 MG/1
12.5-25 TABLET ORAL EVERY 4 HOURS PRN
Status: DISCONTINUED | OUTPATIENT
Start: 2024-03-24 | End: 2024-03-25 | Stop reason: HOSPADM

## 2024-03-24 RX ORDER — HYDROCODONE BITARTRATE AND ACETAMINOPHEN 5; 325 MG/1; MG/1
1 TABLET ORAL EVERY 6 HOURS PRN
Status: DISCONTINUED | OUTPATIENT
Start: 2024-03-24 | End: 2024-03-25 | Stop reason: HOSPADM

## 2024-03-24 RX ORDER — MORPHINE SULFATE 4 MG/ML
4 INJECTION INTRAVENOUS EVERY 4 HOURS PRN
Status: DISCONTINUED | OUTPATIENT
Start: 2024-03-24 | End: 2024-03-25 | Stop reason: HOSPADM

## 2024-03-24 RX ORDER — POLYETHYLENE GLYCOL 3350 17 G/17G
1 POWDER, FOR SOLUTION ORAL
Status: DISCONTINUED | OUTPATIENT
Start: 2024-03-24 | End: 2024-03-24

## 2024-03-24 RX ADMIN — ONDANSETRON 4 MG: 2 INJECTION INTRAMUSCULAR; INTRAVENOUS at 12:05

## 2024-03-24 RX ADMIN — SODIUM CHLORIDE, POTASSIUM CHLORIDE, SODIUM LACTATE AND CALCIUM CHLORIDE: 600; 310; 30; 20 INJECTION, SOLUTION INTRAVENOUS at 16:47

## 2024-03-24 RX ADMIN — IOHEXOL 80 ML: 350 INJECTION, SOLUTION INTRAVENOUS at 13:45

## 2024-03-24 RX ADMIN — CEFTRIAXONE SODIUM 1000 MG: 1 INJECTION, POWDER, FOR SOLUTION INTRAMUSCULAR; INTRAVENOUS at 15:02

## 2024-03-24 RX ADMIN — FENTANYL CITRATE 50 MCG: 50 INJECTION, SOLUTION INTRAMUSCULAR; INTRAVENOUS at 13:19

## 2024-03-24 RX ADMIN — ACETAMINOPHEN 650 MG: 325 TABLET, FILM COATED ORAL at 22:47

## 2024-03-24 RX ADMIN — SODIUM CHLORIDE, POTASSIUM CHLORIDE, SODIUM LACTATE AND CALCIUM CHLORIDE 2802 ML: 600; 310; 30; 20 INJECTION, SOLUTION INTRAVENOUS at 12:05

## 2024-03-24 RX ADMIN — KETOROLAC TROMETHAMINE 15 MG: 15 INJECTION, SOLUTION INTRAMUSCULAR; INTRAVENOUS at 12:05

## 2024-03-24 RX ADMIN — AMPICILLIN AND SULBACTAM 3 G: 1; 2 INJECTION, POWDER, FOR SOLUTION INTRAMUSCULAR; INTRAVENOUS at 17:52

## 2024-03-24 RX ADMIN — AMPICILLIN AND SULBACTAM 3 G: 1; 2 INJECTION, POWDER, FOR SOLUTION INTRAMUSCULAR; INTRAVENOUS at 23:56

## 2024-03-24 RX ADMIN — DEXAMETHASONE SODIUM PHOSPHATE 10 MG: 4 INJECTION INTRA-ARTICULAR; INTRALESIONAL; INTRAMUSCULAR; INTRAVENOUS; SOFT TISSUE at 15:02

## 2024-03-24 RX ADMIN — PHENOL 1 SPRAY: 1.5 LIQUID ORAL at 23:54

## 2024-03-24 ASSESSMENT — LIFESTYLE VARIABLES
EVER HAD A DRINK FIRST THING IN THE MORNING TO STEADY YOUR NERVES TO GET RID OF A HANGOVER: NO
TOTAL SCORE: 0
AVERAGE NUMBER OF DAYS PER WEEK YOU HAVE A DRINK CONTAINING ALCOHOL: 0
ON A TYPICAL DAY WHEN YOU DRINK ALCOHOL HOW MANY DRINKS DO YOU HAVE: 0
SUBSTANCE_ABUSE: 0
TOTAL SCORE: 0
DOES PATIENT WANT TO STOP DRINKING: NO
HOW MANY TIMES IN THE PAST YEAR HAVE YOU HAD 5 OR MORE DRINKS IN A DAY: 0
HAVE YOU EVER FELT YOU SHOULD CUT DOWN ON YOUR DRINKING: NO
HAVE PEOPLE ANNOYED YOU BY CRITICIZING YOUR DRINKING: NO
EVER FELT BAD OR GUILTY ABOUT YOUR DRINKING: NO
ALCOHOL_USE: NO
CONSUMPTION TOTAL: NEGATIVE
TOTAL SCORE: 0

## 2024-03-24 ASSESSMENT — PAIN DESCRIPTION - PAIN TYPE
TYPE: ACUTE PAIN

## 2024-03-24 ASSESSMENT — PATIENT HEALTH QUESTIONNAIRE - PHQ9
2. FEELING DOWN, DEPRESSED, IRRITABLE, OR HOPELESS: SEVERAL DAYS
SUM OF ALL RESPONSES TO PHQ QUESTIONS 1-9: 5
3. TROUBLE FALLING OR STAYING ASLEEP OR SLEEPING TOO MUCH: SEVERAL DAYS
8. MOVING OR SPEAKING SO SLOWLY THAT OTHER PEOPLE COULD HAVE NOTICED. OR THE OPPOSITE, BEING SO FIGETY OR RESTLESS THAT YOU HAVE BEEN MOVING AROUND A LOT MORE THAN USUAL: NOT AT ALL
6. FEELING BAD ABOUT YOURSELF - OR THAT YOU ARE A FAILURE OR HAVE LET YOURSELF OR YOUR FAMILY DOWN: NOT AL ALL
4. FEELING TIRED OR HAVING LITTLE ENERGY: SEVERAL DAYS
5. POOR APPETITE OR OVEREATING: SEVERAL DAYS
7. TROUBLE CONCENTRATING ON THINGS, SUCH AS READING THE NEWSPAPER OR WATCHING TELEVISION: NOT AT ALL
9. THOUGHTS THAT YOU WOULD BE BETTER OFF DEAD, OR OF HURTING YOURSELF: NOT AT ALL
SUM OF ALL RESPONSES TO PHQ9 QUESTIONS 1 AND 2: 2
1. LITTLE INTEREST OR PLEASURE IN DOING THINGS: SEVERAL DAYS

## 2024-03-24 ASSESSMENT — COGNITIVE AND FUNCTIONAL STATUS - GENERAL
MOBILITY SCORE: 24
SUGGESTED CMS G CODE MODIFIER DAILY ACTIVITY: CH
DAILY ACTIVITIY SCORE: 24
SUGGESTED CMS G CODE MODIFIER MOBILITY: CH

## 2024-03-24 ASSESSMENT — ENCOUNTER SYMPTOMS
FEVER: 1
BLURRED VISION: 0
SORE THROAT: 1
SPEECH CHANGE: 0
SENSORY CHANGE: 0
DOUBLE VISION: 0
VOMITING: 1
ROS GI COMMENTS: ODYNOPHAGIA
PALPITATIONS: 0
SHORTNESS OF BREATH: 0
ABDOMINAL PAIN: 0
FALLS: 0
NAUSEA: 1
COUGH: 0
NERVOUS/ANXIOUS: 0
FOCAL WEAKNESS: 0
DIARRHEA: 1
SINUS PAIN: 0

## 2024-03-24 ASSESSMENT — FIBROSIS 4 INDEX
FIB4 SCORE: 0.31
FIB4 SCORE: .3464101615137754587

## 2024-03-24 NOTE — ED TRIAGE NOTES
"Chief Complaint   Patient presents with    N/V     X 4 days with diarrhea. Pt unable to keep any food down. Pt was seen at [facility] after syncope episode and n/v. Pt reports syncope yesterday with unknown head strike.     Abdominal Pain     Pt was seen at [facility] and had CT performed- only finding was renal cyst.     Sore Throat     Pt had viral testing during last visit, all negative. Pt's wife recently diagnosed with strep.          Pt wheeled to triage for above complaint.  Pt is AO x 4, follows commands, and responds appropriately to questions. Patient's breathing is unlabored and pain is currently 0/10 on the 0-10 pain scale.  Pt placed in lobby. Patient educated on triage process and encouraged to alert staff for any changes.      /84   Pulse (!) 131   Temp (!) 38.3 °C (101 °F) (Oral)   Resp 16   Ht 1.6 m (5' 3\")   Wt 93.4 kg (206 lb)   SpO2 94%     "

## 2024-03-24 NOTE — ED PROVIDER NOTES
ED Provider Note    CHIEF COMPLAINT  Chief Complaint   Patient presents with    N/V     X 4 days with diarrhea. Pt unable to keep any food down. Pt was seen at [facility] after syncope episode and n/v. Pt reports syncope yesterday with unknown head strike.     Abdominal Pain     Pt was seen at [facility] and had CT performed- only finding was renal cyst.     Sore Throat     Pt had viral testing during last visit, all negative. Pt's wife recently diagnosed with strep.        EXTERNAL RECORDS REVIEWED  Other reviewed laboratory analysis from the 22nd of March where the patient had a leukocytosis but no acidosis.  Patient's urinalysis was negative a throat culture was negative    HPI/ROS    Mariela Soni is a 23 y.o. adult who presents with nausea and vomiting.  The patient states the have been sick for about 5 days.  The patient's been having abdominal pain with vomiting and diarrhea.  She states has been unable to keep anything down.  She had a CT scan there was negative recently of the abdomen.  The patient's wife was recently diagnosed with strep.  The patient does have a sore throat and is having hard time drinking fluids.    PAST MEDICAL HISTORY       SURGICAL HISTORY  patient denies any surgical history    FAMILY HISTORY  History reviewed. No pertinent family history.    SOCIAL HISTORY  Social History     Tobacco Use    Smoking status: Never    Smokeless tobacco: Never   Vaping Use    Vaping Use: Never used   Substance and Sexual Activity    Alcohol use: Yes     Comment: Rarely    Drug use: Not Currently     Types: Inhaled, Marijuana     Comment: occ    Sexual activity: Not on file       CURRENT MEDICATIONS  Home Medications       Reviewed by Pauline Colon R.N. (Registered Nurse) on 03/24/24 at 1049  Med List Status: Partial     Medication Last Dose Status   amoxicillin (AMOXIL) 500 MG Cap  Active   atenolol (TENORMIN) 25 MG Tab  Active   ibuprofen (MOTRIN) 600 MG Tab  Active   ondansetron (ZOFRAN ODT) 4 MG  "TABLET DISPERSIBLE  Active                    ALLERGIES  No Known Allergies    PHYSICAL EXAM  VITAL SIGNS: /70   Pulse (!) 122   Temp (!) 38.3 °C (101 °F) (Oral)   Resp 20   Ht 1.6 m (5' 3\")   Wt 93.4 kg (206 lb)   SpO2 93%   BMI 36.49 kg/m²    In general the patient appears ill    Ears TMs intact without erythema, nares are moist, oral pharynx patient does have tonsillar hypertrophy with erythema and exudates    Neck has anterior cervical adenopathy    Pulmonary the patient's lungs are clear to auscultation bilaterally    Cardiovascular S1-S2 with a tachycardic rate    GI abdomen soft    Skin no rashes, pallor, no jaundice    Extremities no distal edema    Neurologic examination is grossly intact    DIAGNOSTIC STUDIES   Results for orders placed or performed during the hospital encounter of 03/24/24   CBC with Differential   Result Value Ref Range    WBC 16.1 (H) 4.8 - 10.8 K/uL    RBC 5.15 4.70 - 6.10 M/uL    Hemoglobin 14.7 14.0 - 18.0 g/dL    Hematocrit 44.5 42.0 - 52.0 %    MCV 86.4 81.4 - 97.8 fL    MCH 28.5 27.0 - 33.0 pg    MCHC 33.0 32.3 - 36.5 g/dL    RDW 41.4 35.9 - 50.0 fL    Platelet Count 345 164 - 446 K/uL    MPV 8.9 (L) 9.0 - 12.9 fL    Neutrophils-Polys 69.60 44.00 - 72.00 %    Lymphocytes 15.20 (L) 22.00 - 41.00 %    Monocytes 14.10 (H) 0.00 - 13.40 %    Eosinophils 0.00 0.00 - 6.90 %    Basophils 0.40 0.00 - 1.80 %    Immature Granulocytes 0.70 0.00 - 0.90 %    Nucleated RBC 0.00 0.00 - 0.20 /100 WBC    Neutrophils (Absolute) 11.19 (H) 1.82 - 7.42 K/uL    Lymphs (Absolute) 2.44 1.00 - 4.80 K/uL    Monos (Absolute) 2.26 (H) 0.00 - 0.85 K/uL    Eos (Absolute) 0.00 0.00 - 0.51 K/uL    Baso (Absolute) 0.06 0.00 - 0.12 K/uL    Immature Granulocytes (abs) 0.11 0.00 - 0.11 K/uL    NRBC (Absolute) 0.00 K/uL   Complete Metabolic Panel   Result Value Ref Range    Sodium 133 (L) 135 - 145 mmol/L    Potassium 3.9 3.6 - 5.5 mmol/L    Chloride 96 96 - 112 mmol/L    Co2 19 (L) 20 - 33 mmol/L    " Anion Gap 18.0 (H) 7.0 - 16.0    Glucose 108 (H) 65 - 99 mg/dL    Bun 9 8 - 22 mg/dL    Creatinine 0.98 0.50 - 1.40 mg/dL    Calcium 9.0 8.5 - 10.5 mg/dL    Correct Calcium 8.8 8.5 - 10.5 mg/dL    AST(SGOT) 27 12 - 45 U/L    ALT(SGPT) 27 2 - 50 U/L    Alkaline Phosphatase 72 U/L    Total Bilirubin 0.5 0.1 - 1.5 mg/dL    Albumin 4.2 3.2 - 4.9 g/dL    Total Protein 8.0 6.0 - 8.2 g/dL    Globulin 3.8 (H) 1.9 - 3.5 g/dL    A-G Ratio 1.1 g/dL   Lipase   Result Value Ref Range    Lipase 29 11 - 82 U/L   ESTIMATED GFR   Result Value Ref Range    GFR (CKD-EPI) 111 >60 mL/min/1.73 m 2   Lactic Acid   Result Value Ref Range    Lactic Acid 1.0 0.5 - 2.0 mmol/L   MONONUCLEOSIS TEST QUAL   Result Value Ref Range    Heterophile Screen Negative Negative   Group A Strep by PCR    Specimen: Throat   Result Value Ref Range    Group A Strep by PCR Not Detected Not Detected   EKG   Result Value Ref Range    Report       [facility] Emergency Dept.    Test Date:  2024  Pt Name:    CORTNEY ENNIS               Department: ER  MRN:        [id number]                      Room:  Gender:     Female                       Technician: [id number]  :        2000                   Requested By:ER TRIAGE PROTOCOL  Order #:    [id number]                    Reading MD: LINDA TATUM MD    Measurements  Intervals                                Axis  Rate:       129                          P:          46  WV:         129                          QRS:        35  QRSD:       83                           T:          5  QT:         288  QTc:        422    Interpretive Statements  Twelve-lead EKG shows a sinus tachycardia with a ventricular to 129, normal  QRS, normal intervals, no ST segment elevation or depression, T wave inverted  in lead III overall no acute ischemic change  Electronically Signed On 2024 14:06:54 PDT by LINDA TATUM MD     POC CoV-2, FLU A/B, RSV by PCR   Result Value Ref Range    POC Influenza  A RNA, PCR Negative Negative    POC Influenza B RNA, PCR Negative Negative    POC RSV, by PCR Negative Negative    POC SARS-CoV-2, PCR NotDetected      EKG  I have independently interpreted this EKG  Please see my interpretation above      RADIOLOGY  CT-SOFT TISSUE NECK WITH   Final Result      1.  Bilateral palatine tonsillar enlargement right greater than left without abscess.   2.  Moderate bilateral symmetric neck lymphadenopathy. This could potentially be reactive adenopathy although given the degree of the adenopathy, patient should have follow-up imaging at least with ultrasound to assess resolution and exclude neoplastic    etiology.          COURSE & MEDICAL DECISION MAKING    This is a 23-year-old female who presents to the emergency department acutely ill in appearance.  Clinically the patient did have an exudative tonsillitis therefore I did send a rapid strep as well as mono testing both of which came back negative.  I did perform a CT scan as the patient does have a quite muffled voice and significant discomfort to make sure there is no retropharyngeal involvement or peritonsillar abscess.  This did show the bilateral tonsillar enlargement with the right greater than left without abscess formation.  The patient states they received a dose of Rocephin 2 weeks ago for a positive strep test and subsequently went on antibiotics after oral procedure.  The patient also presented with abdominal pain and diarrhea.  There is been no blood in the stool.  C. difficile colitis would still be in the differential and I will order C. difficile toxin.  Due to the clinical exudative tonsillitis the patient will receive Rocephin.  The patient does not have an acidosis.  She does have a leukocytosis and admit the patient to the hospital pending blood cultures.  FINAL DIAGNOSIS  1.  Exudative tonsillitis  2.  Abdominal pain  3.  Diarrhea  4.  Rule out bacteremia and early sepsis       Disposition  The patient will be  admitted in stable condition    Electronically signed by: Tree Branham M.D., 3/24/2024 11:44 AM

## 2024-03-25 VITALS
WEIGHT: 213.63 LBS | HEIGHT: 63 IN | SYSTOLIC BLOOD PRESSURE: 128 MMHG | HEART RATE: 74 BPM | TEMPERATURE: 97.7 F | OXYGEN SATURATION: 95 % | BODY MASS INDEX: 37.85 KG/M2 | RESPIRATION RATE: 18 BRPM | DIASTOLIC BLOOD PRESSURE: 80 MMHG

## 2024-03-25 LAB
ANION GAP SERPL CALC-SCNC: 14 MMOL/L (ref 7–16)
BASOPHILS # BLD AUTO: 0.2 % (ref 0–1.8)
BASOPHILS # BLD: 0.02 K/UL (ref 0–0.12)
BUN SERPL-MCNC: 8 MG/DL (ref 8–22)
C DIFF DNA SPEC QL NAA+PROBE: NEGATIVE
C DIFF TOX GENS STL QL NAA+PROBE: NEGATIVE
CALCIUM SERPL-MCNC: 9 MG/DL (ref 8.5–10.5)
CHLORIDE SERPL-SCNC: 102 MMOL/L (ref 96–112)
CO2 SERPL-SCNC: 20 MMOL/L (ref 20–33)
CREAT SERPL-MCNC: 0.51 MG/DL (ref 0.5–1.4)
EOSINOPHIL # BLD AUTO: 0 K/UL (ref 0–0.51)
EOSINOPHIL NFR BLD: 0 % (ref 0–6.9)
ERYTHROCYTE [DISTWIDTH] IN BLOOD BY AUTOMATED COUNT: 41 FL (ref 35.9–50)
GFR SERPLBLD CREATININE-BSD FMLA CKD-EPI: 145 ML/MIN/1.73 M 2
GLUCOSE SERPL-MCNC: 139 MG/DL (ref 65–99)
HCT VFR BLD AUTO: 39.6 % (ref 42–52)
HGB BLD-MCNC: 13.2 G/DL (ref 14–18)
IMM GRANULOCYTES # BLD AUTO: 0.1 K/UL (ref 0–0.11)
IMM GRANULOCYTES NFR BLD AUTO: 0.9 % (ref 0–0.9)
LYMPHOCYTES # BLD AUTO: 1.24 K/UL (ref 1–4.8)
LYMPHOCYTES NFR BLD: 11.6 % (ref 22–41)
MCH RBC QN AUTO: 28.6 PG (ref 27–33)
MCHC RBC AUTO-ENTMCNC: 33.3 G/DL (ref 32.3–36.5)
MCV RBC AUTO: 85.7 FL (ref 81.4–97.8)
MONOCYTES # BLD AUTO: 0.67 K/UL (ref 0–0.85)
MONOCYTES NFR BLD AUTO: 6.3 % (ref 0–13.4)
NEUTROPHILS # BLD AUTO: 8.62 K/UL (ref 1.82–7.42)
NEUTROPHILS NFR BLD: 81 % (ref 44–72)
NRBC # BLD AUTO: 0 K/UL
NRBC BLD-RTO: 0 /100 WBC (ref 0–0.2)
PLATELET # BLD AUTO: 344 K/UL (ref 164–446)
PMV BLD AUTO: 9 FL (ref 9–12.9)
POTASSIUM SERPL-SCNC: 4.4 MMOL/L (ref 3.6–5.5)
RBC # BLD AUTO: 4.62 M/UL (ref 4.7–6.1)
SODIUM SERPL-SCNC: 136 MMOL/L (ref 135–145)
WBC # BLD AUTO: 10.7 K/UL (ref 4.8–10.8)

## 2024-03-25 PROCEDURE — 700111 HCHG RX REV CODE 636 W/ 250 OVERRIDE (IP): Mod: JZ | Performed by: STUDENT IN AN ORGANIZED HEALTH CARE EDUCATION/TRAINING PROGRAM

## 2024-03-25 PROCEDURE — 36415 COLL VENOUS BLD VENIPUNCTURE: CPT

## 2024-03-25 PROCEDURE — 700105 HCHG RX REV CODE 258: Performed by: STUDENT IN AN ORGANIZED HEALTH CARE EDUCATION/TRAINING PROGRAM

## 2024-03-25 PROCEDURE — 85025 COMPLETE CBC W/AUTO DIFF WBC: CPT

## 2024-03-25 PROCEDURE — 99239 HOSP IP/OBS DSCHRG MGMT >30: CPT | Mod: GC | Performed by: INTERNAL MEDICINE

## 2024-03-25 PROCEDURE — 80048 BASIC METABOLIC PNL TOTAL CA: CPT

## 2024-03-25 RX ORDER — AMOXICILLIN AND CLAVULANATE POTASSIUM 875; 125 MG/1; MG/1
1 TABLET, FILM COATED ORAL 2 TIMES DAILY
Qty: 14 TABLET | Refills: 0 | Status: ACTIVE | OUTPATIENT
Start: 2024-03-25 | End: 2024-03-25

## 2024-03-25 RX ORDER — PREDNISONE 20 MG/1
40 TABLET ORAL DAILY
Qty: 10 TABLET | Refills: 0 | Status: SHIPPED | OUTPATIENT
Start: 2024-03-25 | End: 2024-03-30

## 2024-03-25 RX ORDER — AMOXICILLIN AND CLAVULANATE POTASSIUM 875; 125 MG/1; MG/1
1 TABLET, FILM COATED ORAL 2 TIMES DAILY
Qty: 20 TABLET | Refills: 0 | Status: ACTIVE | OUTPATIENT
Start: 2024-03-25 | End: 2024-04-04

## 2024-03-25 RX ADMIN — DEXAMETHASONE SODIUM PHOSPHATE 4 MG: 4 INJECTION INTRA-ARTICULAR; INTRALESIONAL; INTRAMUSCULAR; INTRAVENOUS; SOFT TISSUE at 03:08

## 2024-03-25 RX ADMIN — ONDANSETRON 4 MG: 2 INJECTION INTRAMUSCULAR; INTRAVENOUS at 09:36

## 2024-03-25 RX ADMIN — AMPICILLIN AND SULBACTAM 3 G: 1; 2 INJECTION, POWDER, FOR SOLUTION INTRAMUSCULAR; INTRAVENOUS at 05:32

## 2024-03-25 RX ADMIN — DEXAMETHASONE SODIUM PHOSPHATE 4 MG: 4 INJECTION INTRA-ARTICULAR; INTRALESIONAL; INTRAMUSCULAR; INTRAVENOUS; SOFT TISSUE at 16:33

## 2024-03-25 RX ADMIN — AMPICILLIN AND SULBACTAM 3 G: 1; 2 INJECTION, POWDER, FOR SOLUTION INTRAMUSCULAR; INTRAVENOUS at 16:37

## 2024-03-25 RX ADMIN — AMPICILLIN AND SULBACTAM 3 G: 1; 2 INJECTION, POWDER, FOR SOLUTION INTRAMUSCULAR; INTRAVENOUS at 12:31

## 2024-03-25 ASSESSMENT — PAIN DESCRIPTION - PAIN TYPE: TYPE: ACUTE PAIN

## 2024-03-25 NOTE — PROGRESS NOTES
Lab called for critical lab value of positive blood culture at 1010, MD messaged at 1012, MD messaged back.

## 2024-03-25 NOTE — ASSESSMENT & PLAN NOTE
Suspect secondary to pain, anxiety surrounding acute admission.  IV antihypertensives ordered with parameters, if she requires multiple pushes consider p.o. regimen however will treat pain first.

## 2024-03-25 NOTE — ASSESSMENT & PLAN NOTE
This is Sepsis Present on admission  SIRS criteria identified on my evaluation include: Fever, with temperature greater than 100.9 deg F, Tachycardia, with heart rate greater than 90 BPM, and Leukocytosis, with WBC greater than 12,000  Clinical indicators of end organ dysfunction include Hypotension with decrease in systolic blood pressure of more than 40mmHg  Source is exudative tonsillitis  Sepsis protocol initiated  Crystalloid Fluid Administration: Fluid resuscitation ordered per standard protocol - 30 mL/kg per current or ideal body weight  IV antibiotics as appropriate for source of sepsis  Reassessment: I have reassessed the patient's hemodynamic status

## 2024-03-25 NOTE — CARE PLAN
The patient is Stable - Low risk of patient condition declining or worsening    Shift Goals  Clinical Goals: IV ABX, Monitor for loose stools, IV LR, rest, control sore throat pain  Patient Goals: Rest  Family Goals: cathryn    Progress made toward(s) clinical / shift goals:      Problem: Pain - Standard  Goal: Alleviation of pain or a reduction in pain to the patient’s comfort goal  Outcome: Progressing  Note: Sore throat pain treated with Tylenol 650 mg and Chloraseptic spray.      Problem: Fluid Volume  Goal: Fluid volume balance will be maintained  Outcome: Progressing  Note: Pt receiving  mL/hr. Oral liquids encouraged.       Problem: Urinary - Renal Perfusion  Goal: Ability to achieve and maintain adequate renal perfusion and functioning will improve  Outcome: Progressing  Note: Pt voiding in toilet with no report of dysuria.       Problem: Respiratory  Goal: Patient will achieve/maintain optimum respiratory ventilation and gas exchange  Outcome: Progressing  Note: Pt remains on RA with no s/sx of sob while ambulating to the restroom, washing hands, or returning to bed.        Unasyn infused q 6 hrs.

## 2024-03-25 NOTE — H&P
Hospital Medicine History & Physical Note    Date of Service  3/24/2024    Primary Care Physician  PATRICK Hebert.    Consultants  None    Code Status  Full Code    Chief Complaint  Chief Complaint   Patient presents with    N/V     X 4 days with diarrhea. Pt unable to keep any food down. Pt was seen at [facility] after syncope episode and n/v. Pt reports syncope yesterday with unknown head strike.     Abdominal Pain     Pt was seen at [facility] and had CT performed- only finding was renal cyst.     Sore Throat     Pt had viral testing during last visit, all negative. Pt's wife recently diagnosed with strep.        History of Presenting Illness  Mariela Soni is a 23 y.o. adult who presented 3/24/2024 with nausea vomiting, sore throat and odynophagia.    Patient reports she has been feeling unwell for the past 5 days, she has been having abdominal pain with vomiting, diarrhea, sore throat and odynophagia, fatigue and malaise, decreased p.o. intake.  She reports members of her family have strep throat, she was diagnosed with strep throat and treated a few weeks ago.  Reporting subjective fevers, no chills, no headache or vision changes no chest pain dyspnea cough dysuria.    In the ED she is febrile, tachycardic, respiratory rate 16-20 systolic blood pressure from 120s to 180s pulse ox 91 to 95% on room air.  She has leukocytosis, mild hyponatremia mild metabolic acidosis mild hyperglycemia normal renal and liver function lactic acid 1.0 heterophile screen negative for group A strep negative influenza RSV and COVID swab negative CT soft tissue neck with contrast shows bilateral palate teen tonsillar enlargement right greater than left without abscess, moderate bilateral symmetrical neck lymphadenopathy could potentially be reactive although given degree of adenopathy patient should have follow-up imaging at least with ultrasound to assess resolution and exclude neoplastic process.  I discussed this with the  patient and friend at bedside.  Patient also presents with muffled voice, worsening swelling, ERP requesting admission for observation of respiratory status and ability to tolerate p.o. intake as well as IV antibiotics.    I discussed the plan of care with patient, family, bedside RN, charge RN, , and pharmacy.    Review of Systems  Review of Systems   Constitutional:  Positive for fever and malaise/fatigue.   HENT:  Positive for sore throat. Negative for congestion and sinus pain.    Eyes:  Negative for blurred vision and double vision.   Respiratory:  Negative for cough and shortness of breath.    Cardiovascular:  Negative for chest pain and palpitations.   Gastrointestinal:  Positive for diarrhea, nausea and vomiting. Negative for abdominal pain.        Odynophagia   Genitourinary:  Negative for dysuria and urgency.   Musculoskeletal:  Negative for falls and joint pain.   Neurological:  Negative for sensory change, speech change and focal weakness.   Psychiatric/Behavioral:  Negative for substance abuse. The patient is not nervous/anxious.        Past Medical History   has no past medical history on file.    Surgical History   has no past surgical history on file.     Family History  family history is not on file.   Family history reviewed with patient. There is no family history that is pertinent to the chief complaint.     Social History   reports that she has never smoked. She has never used smokeless tobacco. She reports current alcohol use. She reports that she does not currently use drugs after having used the following drugs: Inhaled and Marijuana.    Allergies  No Known Allergies    Medications  Prior to Admission Medications   Prescriptions Last Dose Informant Patient Reported? Taking?   amoxicillin (AMOXIL) 500 MG Cap 3/17/2024 at complete Patient Yes No   Sig: Take 500 mg by mouth 3 times a day. 5 day course   ondansetron (ZOFRAN ODT) 4 MG TABLET DISPERSIBLE 3/24/2024 at AM Patient No Yes    Sig: Take 1 Tablet by mouth every 6 hours as needed for Nausea/Vomiting for up to 10 days.      Facility-Administered Medications: None       Physical Exam  Temp:  [37.4 °C (99.3 °F)-38.3 °C (101 °F)] 37.4 °C (99.3 °F)  Pulse:  [102-131] 110  Resp:  [16-20] 18  BP: (121-183)/() 121/76  SpO2:  [91 %-95 %] 92 %  Blood Pressure: 121/76   Temperature: 37.4 °C (99.3 °F)   Pulse: (!) 110   Respiration: 18   Pulse Oximetry: 92 %       Physical Exam  Vitals and nursing note reviewed. Exam conducted with a chaperone present.   Constitutional:       General: She is not in acute distress.     Appearance: She is obese. She is ill-appearing. She is not toxic-appearing.   HENT:      Head: Normocephalic and atraumatic.      Nose: Nose normal. No rhinorrhea.      Mouth/Throat:      Mouth: Mucous membranes are dry.      Pharynx: Oropharyngeal exudate and posterior oropharyngeal erythema present.      Comments: Exudative tonsillitis  Eyes:      General: No scleral icterus.     Extraocular Movements: Extraocular movements intact.      Conjunctiva/sclera: Conjunctivae normal.   Cardiovascular:      Rate and Rhythm: Regular rhythm. Tachycardia present.      Pulses: Normal pulses.   Pulmonary:      Effort: Pulmonary effort is normal. No respiratory distress.      Breath sounds: Normal breath sounds. No wheezing, rhonchi or rales.   Abdominal:      General: There is no distension.      Palpations: Abdomen is soft.      Tenderness: There is no abdominal tenderness. There is no guarding or rebound.   Musculoskeletal:         General: Normal range of motion.      Cervical back: Normal range of motion and neck supple. No rigidity.      Right lower leg: No edema.      Left lower leg: No edema.   Skin:     General: Skin is warm and dry.      Capillary Refill: Capillary refill takes less than 2 seconds.   Neurological:      General: No focal deficit present.      Mental Status: She is alert and oriented to person, place, and time. Mental  "status is at baseline.      Cranial Nerves: No cranial nerve deficit.      Sensory: No sensory deficit.      Motor: No weakness.      Coordination: Coordination normal.   Psychiatric:         Mood and Affect: Mood normal.         Behavior: Behavior normal.         Thought Content: Thought content normal.         Judgment: Judgment normal.         Laboratory:  Recent Labs     03/22/24  1511 03/24/24  1110   WBC 15.6* 16.1*   RBC 4.94 5.15   HEMOGLOBIN 14.4 14.7   HEMATOCRIT 43.0 44.5   MCV 87.0 86.4   MCH 29.1 28.5   MCHC 33.5 33.0   RDW 42.5 41.4   PLATELETCT 323 345   MPV 8.8* 8.9*     Recent Labs     03/22/24  1511 03/24/24  1110   SODIUM 135 133*   POTASSIUM 4.1 3.9   CHLORIDE 99 96   CO2 23 19*   GLUCOSE 94 108*   BUN 15 9   CREATININE 0.91 0.98   CALCIUM 9.3 9.0     Recent Labs     03/22/24  1511 03/24/24  1110   ALTSGPT 24 27   ASTSGOT 21 27   ALKPHOSPHAT 72 72   TBILIRUBIN 0.9 0.5   LIPASE 24 29   GLUCOSE 94 108*         No results for input(s): \"NTPROBNP\" in the last 72 hours.      Recent Labs     03/22/24  1511   TROPONINT <6       Imaging:  CT-SOFT TISSUE NECK WITH   Final Result      1.  Bilateral palatine tonsillar enlargement right greater than left without abscess.   2.  Moderate bilateral symmetric neck lymphadenopathy. This could potentially be reactive adenopathy although given the degree of the adenopathy, patient should have follow-up imaging at least with ultrasound to assess resolution and exclude neoplastic    etiology.          EKG:  I have personally reviewed the images and compared with prior images. and My impression is: Sinus tachycardia normal QRS normal intervals no ST segment elevation or depression    Assessment/Plan:  Justification for Admission Status  I anticipate this patient will require at least two midnights for appropriate medical management, necessitating inpatient admission because sepsis      * Sepsis (HCC)- (present on admission)  Assessment & Plan  This is Sepsis Present " on admission  SIRS criteria identified on my evaluation include: Fever, with temperature greater than 100.9 deg F, Tachycardia, with heart rate greater than 90 BPM, and Leukocytosis, with WBC greater than 12,000  Clinical indicators of end organ dysfunction include Hypotension with decrease in systolic blood pressure of more than 40mmHg  Source is exudative tonsillitis  Sepsis protocol initiated  Crystalloid Fluid Administration: Fluid resuscitation ordered per standard protocol - 30 mL/kg per current or ideal body weight  IV antibiotics as appropriate for source of sepsis  Reassessment: I have reassessed the patient's hemodynamic status    Elevated blood pressure reading without diagnosis of hypertension  Assessment & Plan  Suspect secondary to pain, anxiety surrounding acute admission.  IV antihypertensives ordered with parameters, if she requires multiple pushes consider p.o. regimen however will treat pain first.    Class 2 obesity due to excess calories in adult  Assessment & Plan  Strong recommendation for follow-up outpatient PCP for lifestyle modification including diet and exercise regiment of at least 30 min of brisk cardiovascular exercise 3-5 times weekly.    Tonsillitis  Assessment & Plan  CT soft tissue neck with contrast shows: Bilateral palatine tonsillar enlargement right greater than left without abscess. Moderate bilateral symmetric neck lymphadenopathy.  On Unasyn, due to the swelling and muffled voice ERP ordered Decadron, I have continued.  Check MRSA nares        VTE prophylaxis: SCDs/TEDs and enoxaparin ppx  Total time spent on admission 77 minutes.    This included my review with ER physician, review of ED events, patient's history, outside records, recent records, face to face interview, physical examination; my review of lab results (CBC, chemistry panel), imaging review (CXR) and ECG. Also includes counseling patient on admission, treatment plan, and documentation of encounter.  In  addition, speaking with specialist Dr. Branham

## 2024-03-25 NOTE — PROGRESS NOTES
4 Eyes Skin Assessment Completed by YOLANDA Christie and YOLANDA Mendoza.    Head WDL  Ears Redness  Nose WDL  Mouth WDL  Neck WDL  Breast/Chest WDL  Shoulder Blades WDL  Spine WDL  (R) Arm/Elbow/Hand WDL  (L) Arm/Elbow/Hand WDL  Abdomen WDL  Groin WDL  Scrotum/Coccyx/Buttocks WDL  (R) Leg WDL  (L) Leg Scab  (R) Heel/Foot/Toe WDL  (L) Heel/Foot/Toe WDL          Devices In Places Pulse Ox      Interventions In Place Pillows and Pressure Redistribution Mattress    Possible Skin Injury No    Pictures Uploaded Into Epic N/A  Wound Consult Placed N/A  RN Wound Prevention Protocol Ordered No

## 2024-03-25 NOTE — ASSESSMENT & PLAN NOTE
CT soft tissue neck with contrast shows: Bilateral palatine tonsillar enlargement right greater than left without abscess. Moderate bilateral symmetric neck lymphadenopathy.  On Unasyn, due to the swelling and muffled voice ERP ordered Decadron, I have continued.  Check MRSA nares

## 2024-03-25 NOTE — DISCHARGE SUMMARY
[facility] Internal Medicine Discharge Summary    Attending: Ca Doty M.d.  Senior Resident: Dr. Argueta  Intern:  Dr. Guerrero  Contact Number: [phone]    CHIEF COMPLAINT ON ADMISSION  Chief Complaint   Patient presents with    N/V     X 4 days with diarrhea. Pt unable to keep any food down. Pt was seen at [facility] after syncope episode and n/v. Pt reports syncope yesterday with unknown head strike.     Abdominal Pain     Pt was seen at [facility] and had CT performed- only finding was renal cyst.     Sore Throat     Pt had viral testing during last visit, all negative. Pt's wife recently diagnosed with strep.        Reason for Admission  N/V; Abdominal Pain     Admission Date  3/24/2024    CODE STATUS  Full Code    HPI & HOSPITAL COURSE  Mariela Soni is a 23 y.o. adult who presented 3/24/2024 with nausea vomiting, sore throat and odynophagia. She reported sick family contacts with strep throat, recent diagnosis several weeks ago with strep throat, followed by dental work getting her wisdom teeth removed, and x5 days of symptoms. She presented tachycardic, tachypnea, with leukocytosis and was found fit for admission to the medical floor for sepsis secondary to tonsillitis. CT neck showed signs of tonsillar inflammation and lymphadenopathy of the neck bilaterally without abscess. Patient was given IVF, started on Unasyn as well as Decadron. Patient was monitored overnight, found with improved symptoms and resolution of leukocytosis following admission. She was able to tolerate food and liquids PO, vital signs improved and did not have any signs of airway compromise. 1 of 2 blood cultures returned with gram positive cocci but MRSA nares was negative, presumed to be contaminant. Patient was found fit for discharge on 3/25/24 with course of antibiotics with Augmentin and Prednisone.     Therefore, she is discharged in good and stable condition to home with close outpatient follow-up.    The patient recovered much more  quickly than anticipated on admission.    Discharge Date  3/25/24    Physical Exam on Day of Discharge  Physical Exam  Vitals reviewed.   Constitutional:       General: She is not in acute distress.     Appearance: Normal appearance. She is well-developed. She is not ill-appearing, toxic-appearing or diaphoretic.   HENT:      Head: Normocephalic.      Nose: No congestion or rhinorrhea.      Mouth/Throat:      Mouth: Mucous membranes are moist.      Pharynx: Posterior oropharyngeal erythema present. No oropharyngeal exudate.   Eyes:      Extraocular Movements: Extraocular movements intact.      Pupils: Pupils are equal, round, and reactive to light.   Cardiovascular:      Rate and Rhythm: Regular rhythm. Tachycardia present.      Heart sounds: Normal heart sounds. No murmur heard.  Pulmonary:      Effort: Pulmonary effort is normal. No respiratory distress.      Breath sounds: Normal breath sounds. No wheezing, rhonchi or rales.   Abdominal:      General: Bowel sounds are normal. There is no distension.      Palpations: Abdomen is soft.      Tenderness: There is no abdominal tenderness. There is no guarding or rebound.   Musculoskeletal:         General: No deformity. Normal range of motion.      Cervical back: Normal range of motion. Tenderness present. No rigidity.   Lymphadenopathy:      Cervical: Cervical adenopathy present.   Skin:     General: Skin is warm and dry.   Neurological:      General: No focal deficit present.      Mental Status: She is alert.      Motor: No abnormal muscle tone.   Psychiatric:         Mood and Affect: Mood normal.         Behavior: Behavior normal.         FOLLOW UP ITEMS POST DISCHARGE  Follow-up with PCP  Complete prescribed course of antibiotics and steroids    DISCHARGE DIAGNOSES  Principal Problem:    Sepsis (HCC) (POA: Yes)  Active Problems:    Tonsillitis (POA: Unknown)    Class 2 obesity due to excess calories in adult (POA: Unknown)    Elevated blood pressure reading without  diagnosis of hypertension (POA: Unknown)  Resolved Problems:    * No resolved hospital problems. *      FOLLOW UP  No future appointments.  No follow-up provider specified.    MEDICATIONS ON DISCHARGE     Medication List        START taking these medications        Instructions   amoxicillin-clavulanate 875-125 MG Tabs  Commonly known as: Augmentin   Take 1 Tablet by mouth 2 times a day for 10 days.  Dose: 1 Tablet     predniSONE 20 MG Tabs  Commonly known as: Deltasone   Take 2 Tablets by mouth every day for 5 days.  Dose: 40 mg            CONTINUE taking these medications        Instructions   ondansetron 4 MG Tbdp  Commonly known as: Zofran ODT   Take 1 Tablet by mouth every 6 hours as needed for Nausea/Vomiting for up to 10 days.  Dose: 4 mg            STOP taking these medications      amoxicillin 500 MG Caps  Commonly known as: Amoxil              Allergies  No Known Allergies    DIET  Orders Placed This Encounter   Procedures    Diet Order Diet: Regular     Standing Status:   Standing     Number of Occurrences:   1     Order Specific Question:   Diet:     Answer:   Regular [1]       ACTIVITY  As tolerated.  Weight bearing as tolerated    CONSULTATIONS  None    PROCEDURES  None    LABORATORY  Lab Results   Component Value Date    SODIUM 136 03/25/2024    POTASSIUM 4.4 03/25/2024    CHLORIDE 102 03/25/2024    CO2 20 03/25/2024    GLUCOSE 139 (H) 03/25/2024    BUN 8 03/25/2024    CREATININE 0.51 03/25/2024        Lab Results   Component Value Date    WBC 10.7 03/25/2024    HEMOGLOBIN 13.2 (L) 03/25/2024    HEMATOCRIT 39.6 (L) 03/25/2024    PLATELETCT 344 03/25/2024 03/24/24 13:51   CT-SOFT TISSUE NECK WITH Rpt   IMPRESSION:     1.  Bilateral palatine tonsillar enlargement right greater than left without abscess.  2.  Moderate bilateral symmetric neck lymphadenopathy. This could potentially be reactive adenopathy although given the degree of the adenopathy, patient should have follow-up imaging at least  with ultrasound to assess resolution and exclude neoplastic   etiology.    Total time of the discharge process exceeds 65 minutes.

## 2024-03-25 NOTE — DIETARY
"Nutrition services: Day 1 of admit.  Mariela Soni is a 23 y.o. adult admitted nausea, vomiting, sore throat, sepsis  History includes Obesity  Patient with decreased PO intake and weight loss noted on admit screen.  Met with patient at bedside.  She stated she has lost approximately in the last few days.  Decreased appetite for less than a week, able to eat better now.  She reported being happy with weight loss and considers herself obese.    Assessment:  Height: 160 cm (5' 3\")  Weight: 96.9 kg (213 lb 10 oz)  Body mass index is 37.84 kg/m².  Diet/Intake: Regular    Evaluation:   Labs and medications reviewed  Anticipate improved PO intake    Malnutrition Risk: Does not meet criteria at this time    Recommendations/Interventions/Plan:    Encourage intake of meals  Document intake of all PO as % taken in ADL's to provide interdisciplinary communication across all shifts.   Monitor weight.  Nutrition rep will continue to see patient for ongoing meal and snack preferences.     RD following    "

## 2024-03-25 NOTE — DISCHARGE INSTRUCTIONS
Discharge Instructions    Discharged to home by car with relative. Discharged via wheelchair, hospital escort: Yes.  Special equipment needed: Oxygen    Be sure to schedule a follow-up appointment with your primary care doctor or any specialists as instructed.     Discharge Plan:   Diet Plan: Discussed  Activity Level: Discussed  Confirmed Follow up Appointment: Patient to Call and Schedule Appointment  Confirmed Symptoms Management: Discussed  Medication Reconciliation Updated: Yes  Influenza Vaccine Indication: Patient Refuses    I understand that a diet low in cholesterol, fat, and sodium is recommended for good health. Unless I have been given specific instructions below for another diet, I accept this instruction as my diet prescription.   Other diet: regular    Special Instructions: None    -Is this patient being discharged with medication to prevent blood clots?  No    Is patient discharged on Warfarin / Coumadin?   No     Tonsillitis    Tonsillitis is an infection of the throat that causes the tonsils to become red, tender, and swollen. Tonsils are tissues in the back of your throat. Each tonsil has crevices (crypts). Tonsils normally work to protect the body from infection.  What are the causes?  Sudden (acute) tonsillitis may be caused by a virus or bacteria, including streptococcal bacteria. Long-lasting (chronic) tonsillitis occurs when the crypts of the tonsils become filled with pieces of food and bacteria, which makes it easy for the tonsils to become repeatedly infected.  Tonsillitis can be spread from person to person when it is caused by a virus or bacteria. It may be spread by inhaling droplets that are released with coughing or sneezing. You may also come into contact with viruses or bacteria on surfaces, such as cups or utensils.  What are the signs or symptoms?  Symptoms of this condition include:  A sore throat. This may include trouble swallowing.  White patches on the tonsils.  Swollen  tonsils.  Fever.  Headache.  Tiredness.  Loss of appetite.  Snoring during sleep when you did not snore before.  Small, foul-smelling, yellowish-white pieces of material (tonsilloliths) that you occasionally cough up or spit out. These can cause you to have bad breath.  How is this diagnosed?  This condition is diagnosed with a physical exam. Diagnosis can be confirmed with the results of lab tests, including a throat culture.  How is this treated?  Treatment for this condition depends on the cause, but usually focuses on treating the symptoms associated with it. Treatment may include:  Medicines to relieve pain and manage fever.  Steroid medicines to reduce swelling.  Antibiotic medicines if the condition is caused by bacteria.  If episodes of tonsillitis are severe and frequent, your health care provider may recommend surgery to remove the tonsils (tonsillectomy).  Follow these instructions at home:  Medicines  Take over-the-counter and prescription medicines only as told by your health care provider.  If you were prescribed an antibiotic medicine, take it as told by your health care provider. Do not stop taking the antibiotic even if you start to feel better.  Eating and drinking  Drink enough fluid to keep your urine pale yellow.  While your throat is sore, eat soft or liquid foods, such as sherbet, soups, or soft, warm cereals, such as oatmeal or hot wheat cereal.  Drink warm liquids.  Eat frozen ice pops.  General instructions  Rest as much as possible and get plenty of sleep.  Gargle with a mixture of salt and water 3-4 times a day or as needed.  To make salt water, completely dissolve ½-1 tsp (3-6 g) of salt in 1 cup (237 mL) of warm water.  Do not swallow the mixture of salt and water.  Wash your hands regularly with soap and water for at least 20 seconds. If soap and water are not available, use hand .  Do not share cups, bottles, or other utensils until your symptoms have gone away.  Do not use  any products that contain nicotine or tobacco. These products include cigarettes, chewing tobacco, and vaping devices, such as e-cigarettes. If you need help quitting, ask your health care provider.  Keep all follow-up visits. This is important.  Contact a health care provider if:  You notice large, tender lumps in your neck that were not there before.  You have a fever that does not go away after 2-3 days.  You develop a rash.  You cough up a green, yellow-brown, or bloody substance.  You cannot swallow liquids or food for 24 hours.  Only one of your tonsils is swollen.  Get help right away if:  You develop any new symptoms, such as vomiting, severe headache, stiff neck, chest pain, trouble breathing, or trouble swallowing.  You have severe throat pain along with drooling or voice changes.  You have severe pain that is not controlled with medicines.  You cannot fully open your mouth.  You develop redness, swelling, or severe pain anywhere in your neck.  Summary  Tonsillitis is an infection of the throat that causes the tonsils to become red, tender, and swollen. The most common symptom is pain in the throat.  Tonsillitis is most often caused by a virus or bacteria.  Get help right away if you develop any new symptoms, such as vomiting, severe headache, stiff neck, chest pain, or trouble breathing.  This information is not intended to replace advice given to you by your health care provider. Make sure you discuss any questions you have with your health care provider.  Document Revised: 05/12/2022 Document Reviewed: 05/12/2022  [employer] Patient Education © 2023 [employer]    Dexamethasone tablets  What is this medication?  DEXAMETHASONE (dex a METH a sone) is a corticosteroid. It is commonly used to treat inflammation of the skin, joints, lungs, and other organs. Common conditions treated include asthma, allergies, and arthritis. It is also used for other conditions, such as blood disorders and diseases of the  adrenal glands.  This medicine may be used for other purposes; ask your health care provider or pharmacist if you have questions.  This medicine may be used for other purposes; ask your health care provider or pharmacist if you have questions.  COMMON BRAND NAME(S): CUSHINGS SYNDROME DIAGNOSTIC, Decadron, Dexabliss, DexPak Jr TaperPak, DexPak TaperPak, Dxevo, Hemady, HiDex, TaperDex, ZCORT, Zema-Landon, ZoDex, ZonaCort 11 Day, ZonaCort 7 Day  What should I tell my care team before I take this medication?  They need to know if you have any of these conditions:  Cushing's syndrome  diabetes  glaucoma  heart disease  high blood pressure  infection like herpes, measles, tuberculosis, or chickenpox  kidney disease  liver disease  mental illness  myasthenia gravis  osteoporosis  previous heart attack  seizures  stomach or intestine problems  thyroid disease  an unusual or allergic reaction to dexamethasone, corticosteroids, other medicines, lactose, foods, dyes, or preservatives  pregnant or trying to get pregnant  breast-feeding  How should I use this medication?  Take this medicine by mouth with a drink of water. Follow the directions on the prescription label. Take it with food or milk to avoid stomach upset. If you are taking this medicine once a day, take it in the morning. Do not take more medicine than you are told to take. Do not suddenly stop taking your medicine because you may develop a severe reaction. Your doctor will tell you how much medicine to take. If your doctor wants you to stop the medicine, the dose may be slowly lowered over time to avoid any side effects.  Talk to your pediatrician regarding the use of this medicine in children. Special care may be needed.  Patients over 65 years old may have a stronger reaction and need a smaller dose.  Overdosage: If you think you have taken too much of this medicine contact a poison control center or emergency room at once.  NOTE: This medicine is only for you. Do  not share this medicine with others.  Overdosage: If you think you have taken too much of this medicine contact a poison control center or emergency room at once.  NOTE: This medicine is only for you. Do not share this medicine with others.  What if I miss a dose?  If you miss a dose, take it as soon as you can. If it is almost time for your next dose, talk to your doctor or health care professional. You may need to miss a dose or take an extra dose. Do not take double or extra doses without advice.  What may interact with this medication?  Do not take this medicine with any of the following medications:  live virus vaccines  This medicine may also interact with the following medications:  aminoglutethimide  amphotericin B  aspirin and aspirin-like medicines  certain antibiotics like erythromycin, clarithromycin, and troleandomycin  certain antivirals for HIV or hepatitis  certain medicines for seizures like carbamazepine, phenobarbital, phenytoin  certain medicines to treat myasthenia gravis  cholestyramine  cyclosporine  digoxin  diuretics  ephedrine  female hormones, like estrogen or progestins and birth control pills  insulin or other medicines for diabetes  isoniazid  ketoconazole  medicines that relax muscles for surgery  mifepristone  NSAIDs, medicines for pain and inflammation, like ibuprofen or naproxen  rifampin  skin tests for allergies  thalidomide  vaccines  warfarin  This list may not describe all possible interactions. Give your health care provider a list of all the medicines, herbs, non-prescription drugs, or dietary supplements you use. Also tell them if you smoke, drink alcohol, or use illegal drugs. Some items may interact with your medicine.  This list may not describe all possible interactions. Give your health care provider a list of all the medicines, herbs, non-prescription drugs, or dietary supplements you use. Also tell them if you smoke, drink alcohol, or use illegal drugs. Some items may  interact with your medicine.  What should I watch for while using this medication?  Visit your health care professional for regular checks on your progress. Tell your health care professional if your symptoms do not start to get better or if they get worse. Your condition will be monitored carefully while you are receiving this medicine.  Wear a medical ID bracelet or chain. Carry a card that describes your disease and details of your medicine and dosage times.  This medicine may increase your risk of getting an infection. Call your health care professional for advice if you get a fever, chills, or sore throat, or other symptoms of a cold or flu. Do not treat yourself. Try to avoid being around people who are sick. Call your health care professional if you are around anyone with measles, chickenpox, or if you develop sores or blisters that do not heal properly.  If you are going to need surgery or other procedures, tell your doctor or health care professional that you have taken this medicine within the last 12 months.  Ask your doctor or health care professional about your diet. You may need to lower the amount of salt you eat.  This medicine may increase blood sugar. Ask your healthcare provider if changes in diet or medicines are needed if you have diabetes.  What side effects may I notice from receiving this medication?  Side effects that you should report to your doctor or health care professional as soon as possible:  allergic reactions like skin rash, itching or hives, swelling of the face, lips, or tongue  bloody or black, tarry stools  changes in emotions or moods  changes in vision  confusion, excitement, restlessness  depressed mood  eye pain  hallucinations  fever or chills, cough, sore throat, pain or difficulty passing urine  muscle weakness  severe or sudden stomach or belly pain  signs and symptoms of high blood sugar such as being more thirsty or hungry or having to urinate more than normal. You may  also feel very tired or have blurry vision.  signs and symptoms of infection like fever; chills; cough; sore throat; pain or trouble passing urine  swelling of ankles, feet  unusual bruising or bleeding  wounds that do not heal  Side effects that usually do not require medical attention (report to your doctor or health care professional if they continue or are bothersome):  increased appetite  increased growth of face or body hair  headache  nausea, vomiting  skin problems, acne, thin and shiny skin  trouble sleeping  weight gain  This list may not describe all possible side effects. Call your doctor for medical advice about side effects. You may report side effects to FDA at 7-724-FDA-6450.  This list may not describe all possible side effects. Call your doctor for medical advice about side effects. You may report side effects to FDA at 4-539-FDA-5717.  Where should I keep my medication?  Keep out of the reach of children.  Store at room temperature between 20 and 25 degrees C (68 and 77 degrees F). Protect from light. Throw away any unused medicine after the expiration date.  NOTE: This sheet is a summary. It may not cover all possible information. If you have questions about this medicine, talk to your doctor, pharmacist, or health care provider.  © 2023 [employer] (2020-07-02 00:00:00)      Does patient have an Advanced Directive and/or POLST?   UNOBTAINABLE [3]    Would patient like to review, revise or complete an Advanced Directive and/or POLST?      Advanced Directive information  been provided to the patient?      Did patient provide a copy of their Advanced Directive or POLST?

## 2024-03-25 NOTE — CARE PLAN
The patient is Watcher - Medium risk of patient condition declining or worsening    Shift Goals  Clinical Goals: IV ABX, Monitor for loose stools, IV LR, rest  Patient Goals: Rest  Family Goals: cathryn    Progress made toward(s) clinical / shift goals:  patient states nausea is better    Patient is not progressing towards the following goals: abd pain and diarrhea have not improved per patient.       Problem: Bowel Elimination  Goal: Establish and maintain regular bowel function  Outcome: Not Progressing  Note: Patient still reports having diarrhea and abdominal pain that is not improving. Hat in place, patient updated on need for stool sample.        Problem: Knowledge Deficit - Standard  Goal: Patient and family/care givers will demonstrate understanding of plan of care, disease process/condition, diagnostic tests and medications  Outcome: Progressing  Note: Pt educated on current POC, expected outcomes and goals, and new medications ordered. All questions and concerns have been answered at this time. MD educated pt on disease pathology and work up.

## 2024-03-26 LAB
BACTERIA BLD CULT: ABNORMAL
BACTERIA BLD CULT: ABNORMAL
SIGNIFICANT IND 70042: ABNORMAL
SITE SITE: ABNORMAL
SOURCE SOURCE: ABNORMAL

## 2024-03-26 NOTE — PROGRESS NOTES
Pt discharged with friend. Pt meds sent to [facility] per request. Last dose of abx given prior to discharge. Pt understanding of medication and discharge instructions. Pt elected to walk out with escort; declined wheelchair.

## 2024-03-29 LAB
BACTERIA BLD CULT: NORMAL
SIGNIFICANT IND 70042: NORMAL
SITE SITE: NORMAL
SOURCE SOURCE: NORMAL

## 2024-04-04 NOTE — DOCUMENTATION QUERY
"                                                                         [facility]                                                                       Query Response Note      PATIENT:               CORTNEY ENNIS  ACCT #:                  [id number]  MRN:                     [id number]  :                      2000  ADMIT DATE:       3/24/2024 10:52 AM  DISCH DATE:        3/25/2024 6:28 PM  RESPONDING  PROVIDER #:        [id number]           QUERY TEXT:    Sepsis with associated organ dysfunction of hypotension is documented in the H&P.  \"Elevated blood pressure reading without diagnosis of hypertension\" is also documented in the H&P.  3/24 Vitals Flowsheet shows Admit BP readings of 123/84, 133/70, 145/100.    After further study, can the diagnosis of sepsis and associated organ dysfunction be clarified?    Sepsis - real or suspected infection plus 2 or more SIRS criteria + organ dysfunction related to sepsis    Temp <96.8 or >101  HR >90  RR >20  WBC <4,000 or > 12,000 or >10% bandemia    The patient's Clinical Indicators include:  H&P:  Sepsis POA:  SIRS: fever, tachycardia, Leukocytosis.  End organ dysfunction: hypotension with decrease in systolic BP of more than 40mmHg.  Source is exudative tonsillitis  3/24 WBC 16.1 , 3/25 WBC 10.7  3/24 Lactic acid 1.0  Admit Vitals: 123/84, 131, 16, 101F, 94%; subsequent BP readings:  133/70, 145/100, 183/115, 154/130  Risk Factors: exudative tonsillitis  Treatment: Unasyn, Rocephin, IVF    Thank you,  Morro House RN, BSN, CCDS  Clinical   Connect via [employer]  Options provided:   -- No associated organ dysfunction is present, sepsis is ruled out   -- Sepsis is associated with organ dysfunction of _______  (please specify), please specify sepsis-related organ dysfunction   -- Other explanation, Please specify      Query created by: Morro House on 3/25/2024 8:52 AM    RESPONSE TEXT:    No associated organ dysfunction is " present, sepsis is ruled out          Electronically signed by:  MELODIE CALDWELL MD 4/4/2024 6:47 AM

## 2024-04-08 ENCOUNTER — APPOINTMENT (OUTPATIENT)
Dept: URGENT CARE | Facility: CLINIC | Age: 24
End: 2024-04-08
Payer: COMMERCIAL

## 2024-04-08 ENCOUNTER — APPOINTMENT (OUTPATIENT)
Dept: URGENT CARE | Facility: PHYSICIAN GROUP | Age: 24
End: 2024-04-08
Payer: COMMERCIAL

## 2024-04-09 ENCOUNTER — OFFICE VISIT (OUTPATIENT)
Dept: URGENT CARE | Facility: PHYSICIAN GROUP | Age: 24
End: 2024-04-09
Payer: COMMERCIAL

## 2024-04-09 ENCOUNTER — HOSPITAL ENCOUNTER (EMERGENCY)
Facility: MEDICAL CENTER | Age: 24
End: 2024-04-09
Attending: EMERGENCY MEDICINE
Payer: COMMERCIAL

## 2024-04-09 VITALS
TEMPERATURE: 96.6 F | RESPIRATION RATE: 14 BRPM | OXYGEN SATURATION: 98 % | BODY MASS INDEX: 34.73 KG/M2 | HEIGHT: 63 IN | WEIGHT: 196 LBS | SYSTOLIC BLOOD PRESSURE: 100 MMHG | DIASTOLIC BLOOD PRESSURE: 79 MMHG | HEART RATE: 116 BPM

## 2024-04-09 VITALS
HEART RATE: 97 BPM | RESPIRATION RATE: 19 BRPM | SYSTOLIC BLOOD PRESSURE: 122 MMHG | TEMPERATURE: 98.2 F | DIASTOLIC BLOOD PRESSURE: 78 MMHG | OXYGEN SATURATION: 98 % | BODY MASS INDEX: 34.91 KG/M2 | WEIGHT: 197.09 LBS

## 2024-04-09 DIAGNOSIS — R05.1 ACUTE COUGH: ICD-10-CM

## 2024-04-09 DIAGNOSIS — R00.0 TACHYCARDIA: ICD-10-CM

## 2024-04-09 DIAGNOSIS — R19.7 VOMITING AND DIARRHEA: ICD-10-CM

## 2024-04-09 DIAGNOSIS — R11.2 NAUSEA AND VOMITING, UNSPECIFIED VOMITING TYPE: ICD-10-CM

## 2024-04-09 DIAGNOSIS — R19.7 DIARRHEA, UNSPECIFIED TYPE: ICD-10-CM

## 2024-04-09 DIAGNOSIS — R11.10 VOMITING AND DIARRHEA: ICD-10-CM

## 2024-04-09 LAB
ALBUMIN SERPL BCP-MCNC: 4.1 G/DL (ref 3.2–4.9)
ALBUMIN/GLOB SERPL: 1.3 G/DL
ALP SERPL-CCNC: 79 U/L
ALT SERPL-CCNC: 48 U/L (ref 2–50)
ANION GAP SERPL CALC-SCNC: 14 MMOL/L (ref 7–16)
AST SERPL-CCNC: 32 U/L (ref 12–45)
BASOPHILS # BLD AUTO: 0.7 % (ref 0–1.8)
BASOPHILS # BLD: 0.08 K/UL (ref 0–0.12)
BILIRUB SERPL-MCNC: 0.6 MG/DL (ref 0.1–1.5)
BUN SERPL-MCNC: 8 MG/DL (ref 8–22)
CALCIUM ALBUM COR SERPL-MCNC: 8.8 MG/DL (ref 8.5–10.5)
CALCIUM SERPL-MCNC: 8.9 MG/DL (ref 8.5–10.5)
CHLORIDE SERPL-SCNC: 104 MMOL/L (ref 96–112)
CO2 SERPL-SCNC: 20 MMOL/L (ref 20–33)
CREAT SERPL-MCNC: 0.7 MG/DL (ref 0.5–1.4)
EOSINOPHIL # BLD AUTO: 0.11 K/UL (ref 0–0.51)
EOSINOPHIL NFR BLD: 1 % (ref 0–6.9)
ERYTHROCYTE [DISTWIDTH] IN BLOOD BY AUTOMATED COUNT: 41.7 FL (ref 35.9–50)
GFR SERPLBLD CREATININE-BSD FMLA CKD-EPI: 132 ML/MIN/1.73 M 2
GLOBULIN SER CALC-MCNC: 3.2 G/DL (ref 1.9–3.5)
GLUCOSE SERPL-MCNC: 91 MG/DL (ref 65–99)
HCG SERPL QL: NEGATIVE
HCT VFR BLD AUTO: 41.8 % (ref 42–52)
HGB BLD-MCNC: 13.8 G/DL (ref 14–18)
IMM GRANULOCYTES # BLD AUTO: 0.04 K/UL (ref 0–0.11)
IMM GRANULOCYTES NFR BLD AUTO: 0.4 % (ref 0–0.9)
LIPASE SERPL-CCNC: 25 U/L (ref 11–82)
LYMPHOCYTES # BLD AUTO: 2.69 K/UL (ref 1–4.8)
LYMPHOCYTES NFR BLD: 25 % (ref 22–41)
MCH RBC QN AUTO: 28.3 PG (ref 27–33)
MCHC RBC AUTO-ENTMCNC: 33 G/DL (ref 32.3–36.5)
MCV RBC AUTO: 85.8 FL (ref 81.4–97.8)
MONOCYTES # BLD AUTO: 1.12 K/UL (ref 0–0.85)
MONOCYTES NFR BLD AUTO: 10.4 % (ref 0–13.4)
NEUTROPHILS # BLD AUTO: 6.72 K/UL (ref 1.82–7.42)
NEUTROPHILS NFR BLD: 62.5 % (ref 44–72)
NRBC # BLD AUTO: 0 K/UL
NRBC BLD-RTO: 0 /100 WBC (ref 0–0.2)
PLATELET # BLD AUTO: 546 K/UL (ref 164–446)
PMV BLD AUTO: 8.5 FL (ref 9–12.9)
POTASSIUM SERPL-SCNC: 4.1 MMOL/L (ref 3.6–5.5)
PROT SERPL-MCNC: 7.3 G/DL (ref 6–8.2)
RBC # BLD AUTO: 4.87 M/UL (ref 4.7–6.1)
S PYO DNA SPEC NAA+PROBE: NOT DETECTED
SODIUM SERPL-SCNC: 138 MMOL/L (ref 135–145)
WBC # BLD AUTO: 10.8 K/UL (ref 4.8–10.8)

## 2024-04-09 PROCEDURE — 0241U HCHG SARS-COV-2 COVID-19 NFCT DS RESP RNA 4 TRGT ED POC: CPT

## 2024-04-09 PROCEDURE — 83690 ASSAY OF LIPASE: CPT

## 2024-04-09 PROCEDURE — 99215 OFFICE O/P EST HI 40 MIN: CPT | Performed by: NURSE PRACTITIONER

## 2024-04-09 PROCEDURE — 84703 CHORIONIC GONADOTROPIN ASSAY: CPT

## 2024-04-09 PROCEDURE — 85025 COMPLETE CBC W/AUTO DIFF WBC: CPT

## 2024-04-09 PROCEDURE — 96374 THER/PROPH/DIAG INJ IV PUSH: CPT

## 2024-04-09 PROCEDURE — 87651 STREP A DNA AMP PROBE: CPT

## 2024-04-09 PROCEDURE — 700111 HCHG RX REV CODE 636 W/ 250 OVERRIDE (IP): Mod: JZ | Performed by: EMERGENCY MEDICINE

## 2024-04-09 PROCEDURE — 3074F SYST BP LT 130 MM HG: CPT | Performed by: NURSE PRACTITIONER

## 2024-04-09 PROCEDURE — 99284 EMERGENCY DEPT VISIT MOD MDM: CPT

## 2024-04-09 PROCEDURE — 80053 COMPREHEN METABOLIC PANEL: CPT

## 2024-04-09 PROCEDURE — 700105 HCHG RX REV CODE 258: Performed by: EMERGENCY MEDICINE

## 2024-04-09 PROCEDURE — 3078F DIAST BP <80 MM HG: CPT | Performed by: NURSE PRACTITIONER

## 2024-04-09 PROCEDURE — 36415 COLL VENOUS BLD VENIPUNCTURE: CPT

## 2024-04-09 RX ORDER — ONDANSETRON 2 MG/ML
4 INJECTION INTRAMUSCULAR; INTRAVENOUS ONCE
Status: COMPLETED | OUTPATIENT
Start: 2024-04-09 | End: 2024-04-09

## 2024-04-09 RX ORDER — SODIUM CHLORIDE 9 MG/ML
1000 INJECTION, SOLUTION INTRAVENOUS ONCE
Status: COMPLETED | OUTPATIENT
Start: 2024-04-09 | End: 2024-04-09

## 2024-04-09 RX ORDER — ONDANSETRON 4 MG/1
4 TABLET, ORALLY DISINTEGRATING ORAL EVERY 6 HOURS PRN
Qty: 9 TABLET | Refills: 0 | Status: SHIPPED | OUTPATIENT
Start: 2024-04-09

## 2024-04-09 RX ADMIN — ONDANSETRON 4 MG: 2 INJECTION INTRAMUSCULAR; INTRAVENOUS at 20:54

## 2024-04-09 RX ADMIN — SODIUM CHLORIDE 1000 ML: 9 INJECTION, SOLUTION INTRAVENOUS at 20:54

## 2024-04-09 ASSESSMENT — FIBROSIS 4 INDEX
FIB4 SCORE: 0.35
FIB4 SCORE: 0.35

## 2024-04-09 ASSESSMENT — PAIN DESCRIPTION - PAIN TYPE: TYPE: ACUTE PAIN

## 2024-04-09 NOTE — PROGRESS NOTES
"Mariela Soni is a 23 y.o. adult who presents for Vomiting (Non stop since being released from ER )    Accompanied by wife  HPI  This is a new problem. Mariela Soni is a 23 y.o. patient who presents to urgent care with c/o:  recently treated for sepsis secondary to acute tonsillitis. He has been vomiting for a month. Not feeling any better.   Vomiting 3 times in the past 24 hours associated with nausea. Having diarrhea. Constant diarrhea without blood - a few days ago it was blackish colored.   Able to eat but it doesn't stay down. Losing weight. Not having any abd pain. Denies fever ( does not have a thermometer).     ROS See HPI    Allergies:     No Known Allergies    PMSFS Hx:  History reviewed. No pertinent past medical history.  History reviewed. No pertinent surgical history.  History reviewed. No pertinent family history.  Social History     Tobacco Use    Smoking status: Never    Smokeless tobacco: Never   Substance Use Topics    Alcohol use: Yes     Comment: Rarely       Problems:   Patient Active Problem List   Diagnosis    Sepsis (HCC)    Tonsillitis    Class 2 obesity due to excess calories in adult    Elevated blood pressure reading without diagnosis of hypertension       Medications:   No current outpatient medications on file prior to visit.     No current facility-administered medications on file prior to visit.        Objective:     /79 (BP Location: Right arm, Patient Position: Sitting, BP Cuff Size: Adult)   Pulse (!) 116   Temp 35.9 °C (96.6 °F) (Temporal)   Resp 14   Ht 1.6 m (5' 3\")   Wt 88.9 kg (196 lb)   SpO2 98%   BMI 34.72 kg/m²     Physical Exam  Vitals and nursing note reviewed.   Constitutional:       General: She is not in acute distress.     Appearance: Normal appearance. She is well-developed. She is ill-appearing. She is not toxic-appearing.   HENT:      Head: Normocephalic.      Right Ear: Hearing normal. No middle ear effusion. Tympanic membrane " is injected. Tympanic membrane is not erythematous.      Left Ear: Hearing normal.  No middle ear effusion. Tympanic membrane is injected. Tympanic membrane is not erythematous.      Nose: No mucosal edema or rhinorrhea.      Right Sinus: No maxillary sinus tenderness or frontal sinus tenderness.      Left Sinus: No maxillary sinus tenderness or frontal sinus tenderness.      Mouth/Throat:      Lips: Pink.      Mouth: Mucous membranes are moist.      Pharynx: Uvula midline. Posterior oropharyngeal erythema present. No oropharyngeal exudate.   Neck:      Trachea: Trachea and phonation normal.   Cardiovascular:      Rate and Rhythm: Regular rhythm. Tachycardia present.      Chest Wall: PMI is not displaced.      Pulses: Normal pulses.      Heart sounds: Normal heart sounds.   Pulmonary:      Effort: Pulmonary effort is normal. No respiratory distress.      Breath sounds: Normal breath sounds. No decreased breath sounds, wheezing, rhonchi or rales.   Abdominal:      Palpations: Abdomen is soft.   Musculoskeletal:         General: Normal range of motion.      Cervical back: Full passive range of motion without pain, normal range of motion and neck supple. Normal range of motion.   Lymphadenopathy:      Cervical: No cervical adenopathy.      Upper Body:      Right upper body: No supraclavicular adenopathy.      Left upper body: No supraclavicular adenopathy.   Skin:     General: Skin is warm and dry.      Capillary Refill: Capillary refill takes less than 2 seconds.   Neurological:      Mental Status: She is alert and oriented to person, place, and time.      Gait: Gait normal.   Psychiatric:         Mood and Affect: Mood normal.         Speech: Speech normal.         Behavior: Behavior normal. Behavior is cooperative.         Assessment /Associated Orders:      1. Nausea and vomiting, unspecified vomiting type        2. Tachycardia        3. Diarrhea, unspecified type        4. Acute cough              Medical Decision  Making:      Rafaela is a 23-year-old adult who presents with no improvement in her nausea, vomiting, sore throat, cough since her hospital admission on March 24, 2024.  She reports that she coughs all night.  She vomits everything that she eats.  Pt's clinical presentation and exam today indicate a need for higher level of care with further evaluation and/or diagnostics.   [facility] was  called to arrange transfer to higher level of care in ER.  Pt is to be transported via POV with wife.   I have reiterated to patient that although an Urgent Care to ER transfer was made this will not necessarily expedite the ER process        Please note that this dictation was created using voice recognition software. I have worked with consultants from the vendor as well as technical experts from [facility] to optimize the interface. I have made every reasonable attempt to correct obvious errors, but I expect that there are errors of grammar and possibly content that I did not discover before finalizing the note.  This note was electronically signed by provider

## 2024-04-10 NOTE — ED PROVIDER NOTES
ED Provider Note    CHIEF COMPLAINT  Chief Complaint   Patient presents with    Nausea/Vomiting/Diarrhea     X 1 month    Fatigue     X 1 month       EXTERNAL RECORDS REVIEWED  Inpatient Notes Discharge summary from 3/25/24 when the patient had been hospitalized for sepsis secondary to pharyngitis and Outpatient Notes Urgent care note from earlier today    HPI/ROS  LIMITATION TO HISTORY   Select: : None  OUTSIDE HISTORIAN(S):  None    Mariela Soni is a 23 y.o. adult who presents to the emergency department for evaluation of nausea, vomiting, diarrhea and fatigue.  The patient states that she was initially diagnosed with strep at the beginning of March.  She developed sepsis and had to be hospitalized.  She required IV steroids and antibiotics.  She was discharged on 3/25/2024.  She states that since then she has been having intermittent nausea, vomiting, diarrhea.  She has been having difficulty tolerating anything by mouth.  She had 2 episodes of nonbloody nonbilious emesis today.  She had 1 episode of watery stool today.  She denies any bloody stools.  She has not had a fever that she is aware of.  She denies any chest pain.  She denies any abdominal pain.  She is still urinating and states that she is drinking fluids.    PAST MEDICAL HISTORY  None    SURGICAL HISTORY  patient denies any surgical history    FAMILY HISTORY  History reviewed. No pertinent family history.    SOCIAL HISTORY  Social History     Tobacco Use    Smoking status: Never    Smokeless tobacco: Never   Vaping Use    Vaping Use: Never used   Substance and Sexual Activity    Alcohol use: Yes     Comment: Rarely    Drug use: Not Currently     Types: Inhaled, Marijuana     Comment: occ    Sexual activity: Not on file       CURRENT MEDICATIONS  Home Medications       Reviewed by Kath Pacheco R.N. (Registered Nurse) on 04/09/24 at 2756  Med List Status: Complete     Medication Last Dose Status        Patient Cristian Taking any  Medications                           ALLERGIES  No Known Allergies    PHYSICAL EXAM  VITAL SIGNS: /78   Pulse 97   Temp 36.8 °C (98.2 °F)   Resp 19   Wt 89.4 kg (197 lb 1.5 oz)   SpO2 98%   BMI 34.91 kg/m²   Constitutional: Alert and in no apparent distress.  HENT: Normocephalic atraumatic. Bilateral external ears normal. Nose normal. Mucous membranes are dry.  Posterior pharynx and soft palate are slightly erythematous.  Uvula is midline soft palate is symmetric.  Eyes: Pupils are equal and reactive. Conjunctiva normal. Non-icteric sclera.   Neck: Normal range of motion without tenderness. Supple. No meningeal signs.  Cardiovascular: Tachycardic rate and regular rhythm. No murmurs, gallops or rubs.  Thorax & Lungs: No increased work of breathing. Breath sounds are clear to auscultation bilaterally. No wheezing, rhonchi or rales.  Abdomen: Soft, nontender and nondistended. No hepatosplenomegaly.  Skin: Warm and dry. No rashes are noted.  Back: No bony tenderness, No CVA tenderness.   Extremities: 2+ peripheral pulses. Cap refill is less than 2 seconds. No edema, cyanosis, or clubbing.  Musculoskeletal: Good range of motion in all major joints. No tenderness to palpation or major deformities noted.   Neurologic: Alert and appropriate for age. The patient moves all 4 extremities without obvious deficits.      EKG/LABS  Results for orders placed or performed during the hospital encounter of 04/09/24   CBC WITH DIFFERENTIAL   Result Value Ref Range    WBC 10.8 4.8 - 10.8 K/uL    RBC 4.87 4.70 - 6.10 M/uL    Hemoglobin 13.8 (L) 14.0 - 18.0 g/dL    Hematocrit 41.8 (L) 42.0 - 52.0 %    MCV 85.8 81.4 - 97.8 fL    MCH 28.3 27.0 - 33.0 pg    MCHC 33.0 32.3 - 36.5 g/dL    RDW 41.7 35.9 - 50.0 fL    Platelet Count 546 (H) 164 - 446 K/uL    MPV 8.5 (L) 9.0 - 12.9 fL    Neutrophils-Polys 62.50 44.00 - 72.00 %    Lymphocytes 25.00 22.00 - 41.00 %    Monocytes 10.40 0.00 - 13.40 %    Eosinophils 1.00 0.00 - 6.90 %     Basophils 0.70 0.00 - 1.80 %    Immature Granulocytes 0.40 0.00 - 0.90 %    Nucleated RBC 0.00 0.00 - 0.20 /100 WBC    Neutrophils (Absolute) 6.72 1.82 - 7.42 K/uL    Lymphs (Absolute) 2.69 1.00 - 4.80 K/uL    Monos (Absolute) 1.12 (H) 0.00 - 0.85 K/uL    Eos (Absolute) 0.11 0.00 - 0.51 K/uL    Baso (Absolute) 0.08 0.00 - 0.12 K/uL    Immature Granulocytes (abs) 0.04 0.00 - 0.11 K/uL    NRBC (Absolute) 0.00 K/uL   COMP METABOLIC PANEL   Result Value Ref Range    Sodium 138 135 - 145 mmol/L    Potassium 4.1 3.6 - 5.5 mmol/L    Chloride 104 96 - 112 mmol/L    Co2 20 20 - 33 mmol/L    Anion Gap 14.0 7.0 - 16.0    Glucose 91 65 - 99 mg/dL    Bun 8 8 - 22 mg/dL    Creatinine 0.70 0.50 - 1.40 mg/dL    Calcium 8.9 8.5 - 10.5 mg/dL    Correct Calcium 8.8 8.5 - 10.5 mg/dL    AST(SGOT) 32 12 - 45 U/L    ALT(SGPT) 48 2 - 50 U/L    Alkaline Phosphatase 79 U/L    Total Bilirubin 0.6 0.1 - 1.5 mg/dL    Albumin 4.1 3.2 - 4.9 g/dL    Total Protein 7.3 6.0 - 8.2 g/dL    Globulin 3.2 1.9 - 3.5 g/dL    A-G Ratio 1.3 g/dL   LIPASE   Result Value Ref Range    Lipase 25 11 - 82 U/L   Group A Strep by PCR    Specimen: Throat   Result Value Ref Range    Group A Strep by PCR Not Detected Not Detected   ESTIMATED GFR   Result Value Ref Range    GFR (CKD-EPI) 132 >60 mL/min/1.73 m 2   HCG QUAL SERUM   Result Value Ref Range    Beta-Hcg Qualitative Serum Negative Negative     COURSE & MEDICAL DECISION MAKING    ASSESSMENT, COURSE AND PLAN  Care Narrative: This is a 23-year-old female presenting to the emergency department for evaluation of nausea, vomiting, diarrhea and fatigue.  On initial evaluation, the patient was noted to be tachycardic.  The remainder vital signs reassuring.  Physical exam was overall reassuring.  Her abdominal dam is benign with no distention or tenderness concerning for obstruction or acute appendicitis.  She did have dry mucous membranes and given her tachycardia an IV was established and she was given IV fluid bolus.   She is also treated with Zofran.  Workup had been ordered in triage.    White count was normal and reassuring.  H&H were stable at 13.8 and 41.8, respectively.  Pregnancy test was negative.  Electrolytes were without any derangement whatsoever.  Renal function was normal and LFTs and lipase were normal as well.    Strep swab was obtained and negative.    Viral panel was negative.    Given the patient's benign abdominal exam I do not think that she requires imaging at this point.    She was observed in the emergency department and tolerated an oral challenge.  Her vital signs normalized.  I do think she is stable for discharge at this time.  I encouraged her to follow-up with her primary care physician and return to the emergency department with any worsening signs or symptoms.    Hydration: HYDRATION: Based on the patient's presentation of Dehydration the patient was given IV fluids. IV Hydration was used because oral hydration was not adequate alone. Upon recheck following hydration, the patient was improved.    ADDITIONAL PROBLEMS MANAGED  Nausea, vomiting and diarrhea    DISPOSITION AND DISCUSSIONS  I have discussed management of the patient with the following physicians and KAYLA's:  None    Discussion of management with other QHP or appropriate source(s): None     Escalation of care considered, and ultimately not performed:acute inpatient care management, however at this time, the patient is most appropriate for outpatient management    Barriers to care at this time, including but not limited to:  None .     Decision tools and prescription drugs considered including, but not limited to:  Zofran .    FINAL IMPRESSION  1. Vomiting and diarrhea      PRESCRIPTIONS  New Prescriptions    ONDANSETRON (ZOFRAN ODT) 4 MG TABLET DISPERSIBLE    Take 1 Tablet by mouth every 6 hours as needed for Nausea/Vomiting for up to 9 doses.     FOLLOW UP  Thao Moore A.P.R.N.  [address]  [address]  [place]  [address]  [phone]    Call in 1 day  To schedule a follow up appointment    [facility], Emergency Dept  [address] [facility] [id number] [phone]  Go to   As needed    -DISCHARGE-    Electronically signed by: Nathalie Bush D.O., 4/9/2024 8:23 PM

## 2024-04-10 NOTE — ED TRIAGE NOTES
.Mariela Soni  .  Chief Complaint   Patient presents with    Nausea/Vomiting/Diarrhea     X 1 month    Fatigue     X 1 month     Patient ambulatory to triage with above complaints. Patient seen at urgent care for same. Patient has completed 3 courses of antibiotics to treat sepsis related to tonsillitis.     Patient to lobby and instructed to inform staff of any needs.

## 2024-04-10 NOTE — ED NOTES
Taken patient from triage waiting room, ambulatory with steady gait, alert/ oriented x 4.Verified patient identification.  Assumed patient care.   Placed on hallway bed and instructed to call for any assistance needed/ or concerns.   Bed on lowest position, side rails up, breaks locked. Awaiting for ERP.

## 2024-04-11 LAB
FLUAV RNA SPEC QL NAA+PROBE: NEGATIVE
FLUBV RNA SPEC QL NAA+PROBE: NEGATIVE
RSV RNA SPEC QL NAA+PROBE: NEGATIVE
SARS-COV-2 RNA RESP QL NAA+PROBE: NOTDETECTED

## 2024-08-08 ENCOUNTER — APPOINTMENT (OUTPATIENT)
Dept: SLEEP MEDICINE | Facility: MEDICAL CENTER | Age: 24
End: 2024-08-08
Attending: INTERNAL MEDICINE
Payer: COMMERCIAL

## 2024-08-08 ASSESSMENT — ENCOUNTER SYMPTOMS
SHORTNESS OF BREATH: 1
RESPIRATORY SYMPTOMS COMMENTS: YES
DYSPNEA AT REST: 0
WHEEZING: 1
CHEST TIGHTNESS: 1
HEMOPTYSIS: 0

## 2024-11-22 NOTE — PROGRESS NOTES
HPI   Chief Complaint   Patient presents with    Dizziness    Vomiting       Patient is a 41-year-old female presenting today with a chief complaint of dizziness and vomiting, patient is over last 3 hours she has been experiencing dizziness, having 1 episode of nausea vomiting, not had any vision changes, has had a mild headache, and tingling throughout her body, patient has not had any recent sinus pain, denies any head injury, does have a history of vertigo, patient denies any abdominal pain, any chest pain shortness of breath, denies any other acute complaints at this time.      History provided by:  Patient          Patient History   No past medical history on file.  Past Surgical History:   Procedure Laterality Date    APPENDECTOMY  2017    Appendectomy     SECTION, CLASSIC  2017     Section     Family History   Problem Relation Name Age of Onset    Ovarian cancer Mother's Sister       Social History     Tobacco Use    Smoking status: Former     Types: Cigarettes    Smokeless tobacco: Never   Vaping Use    Vaping status: Never Used   Substance Use Topics    Alcohol use: Yes     Comment: rarely    Drug use: Not Currently       Physical Exam   ED Triage Vitals   Temperature Heart Rate Respirations BP   24 1724 24 1724 24 1724 24 1724   36.3 °C (97.3 °F) 92 16 154/89      Pulse Ox Temp src Heart Rate Source Patient Position   24 1724 -- -- --   100 %         BP Location FiO2 (%)     24 2211 --     Right arm        Physical Exam  Vitals and nursing note reviewed. Exam conducted with a chaperone present.   Constitutional:       General: She is not in acute distress.     Appearance: Normal appearance. She is normal weight. She is diaphoretic. She is not ill-appearing or toxic-appearing.   HENT:      Head: Normocephalic and atraumatic.      Nose: Nose normal.      Mouth/Throat:      Mouth: Mucous membranes are moist.      Pharynx: Oropharynx is clear.  Subjective     Mariela Soni is a 23 y.o. female who presents with Shortness of Breath (SOB, cough, sore throat, headache x2-3 days. PT notes she typically gets bronchitis qwinter.  )            Cough  This is a new problem. The cough is Non-productive. Associated symptoms include nasal congestion and postnasal drip. Pertinent negatives include no chills or myalgias. Treatments tried: Dayquil. The treatment provided mild relief. Her past medical history is significant for bronchitis. There is no history of environmental allergies.     Patient presents with symptoms that started 3 days ago.  She endorses nasal congestion, postnasal drip, and dry cough.  She denies any fever, chills, fatigue, sore throat, vomiting, diarrhea or bodyaches.  She is taking DayQuil and NyQuil, providing some relief.    Patient's current problem list, medications, and past medical/surgical history were reviewed in Epic.    PMH:  has no past medical history on file.  MEDS:   Current Outpatient Medications:     atenolol (TENORMIN) 25 MG Tab, Take 25 mg by mouth 2 times a day as needed., Disp: , Rfl:     VRAYLAR 3 MG capsule, Take 3 mg by mouth at bedtime., Disp: , Rfl:     estradiol (VIVELLE DOT) 0.1 MG/24HR PATCH BIWEEKLY, Place 1 Patch on the skin every 72 hours. * left thigh*, Disp: , Rfl:     spironolactone (ALDACTONE) 100 MG Tab, Take 200 mg by mouth every day., Disp: , Rfl:   ALLERGIES: No Known Allergies  SURGHX: History reviewed. No pertinent surgical history.  SOCHX:  reports that she has never smoked. She has never used smokeless tobacco. She reports current alcohol use. She reports current drug use. Drugs: Inhaled and Marijuana.  FH: Reviewed with patient, not pertinent to this visit.       Review of Systems   Constitutional:  Negative for chills and malaise/fatigue.   HENT:  Positive for postnasal drip.    Gastrointestinal:  Negative for diarrhea.   Musculoskeletal:  Negative for myalgias.   Endo/Heme/Allergies:     Eyes:      Extraocular Movements: Extraocular movements intact.      Conjunctiva/sclera: Conjunctivae normal.      Pupils: Pupils are equal, round, and reactive to light.   Cardiovascular:      Rate and Rhythm: Normal rate and regular rhythm.      Pulses: Normal pulses.      Heart sounds: Normal heart sounds.   Pulmonary:      Effort: Pulmonary effort is normal.      Breath sounds: Normal breath sounds.   Abdominal:      General: Abdomen is flat. Bowel sounds are normal.      Palpations: Abdomen is soft.   Musculoskeletal:         General: Normal range of motion.   Skin:     General: Skin is warm.      Capillary Refill: Capillary refill takes less than 2 seconds.   Neurological:      General: No focal deficit present.      Mental Status: She is alert and oriented to person, place, and time. Mental status is at baseline.   Psychiatric:         Mood and Affect: Mood normal.         Behavior: Behavior normal.         Thought Content: Thought content normal.         Judgment: Judgment normal.           ED Course & MDM   Diagnoses as of 11/22/24 2334   Dizziness   Vertigo                 No data recorded     [place] Coma Scale Score: 15 (11/21/24 2212 : Alejandrina Padgett RN)                           Medical Decision Making  Patient seen and evaluated at bedside, patient is in no acute distress.  I will order a CT head,.  C, meclizine, Zofran. Differential diagnosis includes but is not limited to vertigo, BPPV, viral gastroenteritis, nausea,  Patient CT scan does not show any acute abnormalities, patient would not like a prescription for any meclizine or Zofran.  Patient was given referral to neurology, advised follow-up with her office.  Patient is agreeable this discharge plan, return precautions were discussed.    Diagnosis: Dizziness, vertigo  .        Procedure  Procedures  Sections of this report were created using voice-to-text technology and may contain errors in translation    Zaheer Brush DO  Emergency  "Negative for environmental allergies.   All other systems reviewed and are negative.             Objective     BP 98/62 (BP Location: Right arm, Patient Position: Sitting, BP Cuff Size: Adult)   Pulse 98   Temp 36.6 °C (97.8 °F) (Temporal)   Resp 16   Ht 1.6 m (5' 3\")   Wt 94.7 kg (208 lb 14.2 oz)   SpO2 96%   BMI 37.00 kg/m²      Physical Exam  Vitals and nursing note reviewed.   Constitutional:       Appearance: Normal appearance.   HENT:      Right Ear: Tympanic membrane, ear canal and external ear normal.      Left Ear: Tympanic membrane, ear canal and external ear normal.      Nose: Congestion present. No rhinorrhea.      Mouth/Throat:      Pharynx: No posterior oropharyngeal erythema.   Cardiovascular:      Rate and Rhythm: Normal rate and regular rhythm.      Pulses: Normal pulses.      Heart sounds: Normal heart sounds.   Pulmonary:      Effort: Pulmonary effort is normal.      Breath sounds: Normal breath sounds. No rhonchi or rales.   Abdominal:      General: Abdomen is flat.      Palpations: Abdomen is soft.   Musculoskeletal:         General: Normal range of motion.      Cervical back: Normal range of motion.   Skin:     General: Skin is warm and dry.   Neurological:      General: No focal deficit present.      Mental Status: She is alert.   Psychiatric:         Mood and Affect: Mood normal.         Behavior: Behavior normal.            Assessment & Plan       1. Viral upper respiratory infection      2. Acute cough    - POCT CEPHEID COV-2, FLU A/B, RSV - PCR  - benzonatate (TESSALON) 100 MG Cap; Take 1 Capsule by mouth 3 times a day as needed for Cough.  Dispense: 30 Capsule; Refill: 0       Patient tested negative for COVID, influenza, and RSV.  Her presentation is consistent with an upper respiratory tract infection, most likely viral.  Advised on symptomatic treatment at home.  May take Tessalon Perles 3 times daily as needed for cough.  Recommended OTC antihistamine p.o. and Flonase nasal " Medicine         Zaheer Brush,   11/22/24 6863     spray daily for nasal congestion.  Instructed to return to clinic with worsening or persistent symptoms.  Discussed treatment plan with patient, s/he is agreeable and verbalized understanding.  Educated patient on signs and symptoms watch out for, when to return to the clinic or go to the ER.    Recommended supportive treatment at home:  OTC Tylenol or Motrin for fever/discomfort.  OTC supportive care for nasal congestion - saline nasal spray/Flonase nasal spray and/or netipot  Humidifier and steam inhalation/warm showers.  Increase oral fluid intake.    Work note given.     Electronically Signed by HORACIO Lewis

## 2025-01-13 ENCOUNTER — HOSPITAL ENCOUNTER (OUTPATIENT)
Facility: MEDICAL CENTER | Age: 25
End: 2025-01-13
Attending: NURSE PRACTITIONER
Payer: COMMERCIAL

## 2025-01-13 ENCOUNTER — OFFICE VISIT (OUTPATIENT)
Dept: URGENT CARE | Facility: CLINIC | Age: 25
End: 2025-01-13
Payer: COMMERCIAL

## 2025-01-13 VITALS
HEIGHT: 63 IN | SYSTOLIC BLOOD PRESSURE: 126 MMHG | DIASTOLIC BLOOD PRESSURE: 84 MMHG | BODY MASS INDEX: 39.25 KG/M2 | TEMPERATURE: 97.3 F | OXYGEN SATURATION: 96 % | WEIGHT: 221.5 LBS | RESPIRATION RATE: 12 BRPM | HEART RATE: 101 BPM

## 2025-01-13 DIAGNOSIS — Z11.3 SCREENING EXAMINATION FOR STI: ICD-10-CM

## 2025-01-13 DIAGNOSIS — Z72.51 HIGH RISK SEXUAL BEHAVIOR, UNSPECIFIED TYPE: ICD-10-CM

## 2025-01-13 LAB
APPEARANCE UR: CLEAR
BILIRUB UR STRIP-MCNC: NEGATIVE MG/DL
COLOR UR AUTO: NORMAL
GLUCOSE UR STRIP.AUTO-MCNC: NEGATIVE MG/DL
KETONES UR STRIP.AUTO-MCNC: NEGATIVE MG/DL
LEUKOCYTE ESTERASE UR QL STRIP.AUTO: NEGATIVE
NITRITE UR QL STRIP.AUTO: NEGATIVE
PH UR STRIP.AUTO: 6 [PH] (ref 5–8)
PROT UR QL STRIP: NEGATIVE MG/DL
RBC UR QL AUTO: NEGATIVE
SP GR UR STRIP.AUTO: >=1.03
UROBILINOGEN UR STRIP-MCNC: NORMAL MG/DL

## 2025-01-13 PROCEDURE — 81002 URINALYSIS NONAUTO W/O SCOPE: CPT | Performed by: NURSE PRACTITIONER

## 2025-01-13 PROCEDURE — 87591 N.GONORRHOEAE DNA AMP PROB: CPT

## 2025-01-13 PROCEDURE — 87491 CHLMYD TRACH DNA AMP PROBE: CPT

## 2025-01-13 PROCEDURE — 99213 OFFICE O/P EST LOW 20 MIN: CPT | Performed by: NURSE PRACTITIONER

## 2025-01-13 RX ORDER — SPIRONOLACTONE 25 MG/1
TABLET ORAL
COMMUNITY

## 2025-01-13 ASSESSMENT — FIBROSIS 4 INDEX: FIB4 SCORE: 0.2

## 2025-01-13 NOTE — LETTER
January 13, 2025        Mariela Soni  [address] [place] [address]        Mariela was seen in our clinic today and she is excused from work for tomorrow.     If you have any questions or concerns, please don't hesitate to call.        Sincerely,        ANA Hsieh.P.SHIRA.    Electronically Signed

## 2025-01-14 LAB
C TRACH DNA SPEC QL NAA+PROBE: NEGATIVE
N GONORRHOEA DNA SPEC QL NAA+PROBE: NEGATIVE
SPECIMEN SOURCE: NORMAL

## 2025-01-14 NOTE — PROGRESS NOTES
Subjective     Mariela Soni is a 24 y.o. person who presents with Exposure to STD (Patient was exposed to hsv, and wanted to be seen )            Exposure to STD  This is a new problem. The current episode started 1 to 4 weeks ago. The problem occurs intermittently. The problem has been unchanged. Pertinent negatives include no abdominal pain, chills, fever, headaches, myalgias, nausea, numbness, rash or urinary symptoms. She has tried nothing for the symptoms.   Possible exposure to herpes; no symptoms.    Patient has no known allergies.  Current Outpatient Medications on File Prior to Visit   Medication Sig Dispense Refill    Nutritional Supplements (EQ ESTROBLEND MENOPAUSE PO)       spironolactone (Aldactone) 5 mg/mL oral suspension       spironolactone (ALDACTONE) 25 MG Tab TAKE 1 TABLET BY MOUTH EVERYDAY MAY INCREASE TO TWICE A DAY AFTER 1 WEEK IF TOLERATING      ondansetron (ZOFRAN ODT) 4 MG TABLET DISPERSIBLE Take 1 Tablet by mouth every 6 hours as needed for Nausea/Vomiting for up to 9 doses. 9 Tablet 0     No current facility-administered medications on file prior to visit.     Social History     Socioeconomic History    Marital status:      Spouse name: Not on file    Number of children: Not on file    Years of education: Not on file    Highest education level: Not on file   Occupational History    Not on file   Tobacco Use    Smoking status: Never    Smokeless tobacco: Never   Vaping Use    Vaping status: Never Used   Substance and Sexual Activity    Alcohol use: Yes     Comment: Rarely    Drug use: Not Currently     Types: Inhaled, Marijuana     Comment: occ    Sexual activity: Not on file   Other Topics Concern    Not on file   Social History Narrative    Not on file     Social Drivers of Health     Financial Resource Strain: Not on file   Food Insecurity: Not on file   Transportation Needs: Not on file   Physical Activity: Not on file   Stress: Not on file   Social Connections: Not  on file   Intimate Partner Violence: Not on file   Housing Stability: Not on file     Breast Cancer-related family history is not on file.      Review of Systems   Constitutional:  Negative for chills, fever and malaise/fatigue.   Gastrointestinal:  Negative for abdominal pain and nausea.   Musculoskeletal:  Negative for myalgias.   Skin:  Negative for rash.   Neurological:  Negative for dizziness, tingling, sensory change, numbness and headaches.              Objective     There were no vitals taken for this visit.     Physical Exam  Constitutional:       Appearance: Normal appearance.   Cardiovascular:      Rate and Rhythm: Normal rate and regular rhythm.      Heart sounds: No murmur heard.  Pulmonary:      Effort: Pulmonary effort is normal.      Breath sounds: Normal breath sounds.   Musculoskeletal:         General: Normal range of motion.   Skin:     General: Skin is warm and dry.   Neurological:      General: No focal deficit present.      Mental Status: She is alert and oriented to person, place, and time.   Psychiatric:         Mood and Affect: Mood normal.         Behavior: Behavior normal.                             Assessment & Plan   1. Screening examination for STI  POCT Urinalysis    Chlamydia/GC, PCR (Urine)    HSV 1/2 IGG W/ TYPE SPECIFIC RFLX      2. High risk sexual behavior, unspecified type  POCT Pregnancy    Chlamydia/GC, PCR (Urine)    CANCELED: Chlamydia/GC, PCR (Genital/Anal swab)    CANCELED: VAGINAL PATHOGENS DNA PANEL      Will call        Assessment & Plan  Screening examination for STI    Orders:    POCT Urinalysis    Chlamydia/GC, PCR (Urine); Future    High risk sexual behavior, unspecified type    Orders:    POCT Pregnancy    Chlamydia/GC, PCR (Urine); Future

## 2025-01-16 ASSESSMENT — ENCOUNTER SYMPTOMS
SENSORY CHANGE: 0
HEADACHES: 0
NAUSEA: 0
MYALGIAS: 0
DIZZINESS: 0
CHILLS: 0
ABDOMINAL PAIN: 0
NUMBNESS: 0
FEVER: 0
TINGLING: 0

## 2025-03-17 ENCOUNTER — OFFICE VISIT (OUTPATIENT)
Dept: URGENT CARE | Facility: CLINIC | Age: 25
End: 2025-03-17
Payer: COMMERCIAL

## 2025-03-17 ENCOUNTER — APPOINTMENT (OUTPATIENT)
Dept: URGENT CARE | Facility: CLINIC | Age: 25
End: 2025-03-17
Payer: COMMERCIAL

## 2025-03-17 ENCOUNTER — OCCUPATIONAL MEDICINE (OUTPATIENT)
Dept: URGENT CARE | Facility: CLINIC | Age: 25
End: 2025-03-17
Payer: COMMERCIAL

## 2025-03-17 VITALS
SYSTOLIC BLOOD PRESSURE: 124 MMHG | TEMPERATURE: 97.6 F | DIASTOLIC BLOOD PRESSURE: 68 MMHG | OXYGEN SATURATION: 97 % | HEART RATE: 105 BPM | RESPIRATION RATE: 16 BRPM | HEIGHT: 63 IN | BODY MASS INDEX: 37.92 KG/M2 | WEIGHT: 214 LBS

## 2025-03-17 VITALS
SYSTOLIC BLOOD PRESSURE: 124 MMHG | TEMPERATURE: 97.6 F | BODY MASS INDEX: 37.92 KG/M2 | RESPIRATION RATE: 16 BRPM | OXYGEN SATURATION: 97 % | HEART RATE: 105 BPM | DIASTOLIC BLOOD PRESSURE: 68 MMHG | WEIGHT: 214 LBS | HEIGHT: 63 IN

## 2025-03-17 DIAGNOSIS — L60.0 INGROWN TOENAIL OF RIGHT FOOT WITH INFECTION: ICD-10-CM

## 2025-03-17 DIAGNOSIS — S16.1XXA CERVICAL MYOFASCIAL STRAIN, INITIAL ENCOUNTER: ICD-10-CM

## 2025-03-17 PROCEDURE — 99213 OFFICE O/P EST LOW 20 MIN: CPT | Mod: 25 | Performed by: PHYSICIAN ASSISTANT

## 2025-03-17 PROCEDURE — 99213 OFFICE O/P EST LOW 20 MIN: CPT | Performed by: PHYSICIAN ASSISTANT

## 2025-03-17 RX ORDER — METHYLPREDNISOLONE 4 MG/1
TABLET ORAL
Qty: 21 TABLET | Refills: 0 | Status: SHIPPED | OUTPATIENT
Start: 2025-03-17

## 2025-03-17 RX ORDER — CYCLOBENZAPRINE HCL 5 MG
5-10 TABLET ORAL 3 TIMES DAILY PRN
Qty: 30 TABLET | Refills: 0 | Status: SHIPPED | OUTPATIENT
Start: 2025-03-17

## 2025-03-17 RX ORDER — CEPHALEXIN 500 MG/1
500 CAPSULE ORAL 3 TIMES DAILY
Qty: 21 CAPSULE | Refills: 0 | Status: SHIPPED | OUTPATIENT
Start: 2025-03-17 | End: 2025-03-24

## 2025-03-17 RX ORDER — KETOROLAC TROMETHAMINE 15 MG/ML
15 INJECTION, SOLUTION INTRAMUSCULAR; INTRAVENOUS ONCE
Status: COMPLETED | OUTPATIENT
Start: 2025-03-17 | End: 2025-03-17

## 2025-03-17 RX ADMIN — KETOROLAC TROMETHAMINE 15 MG: 15 INJECTION, SOLUTION INTRAMUSCULAR; INTRAVENOUS at 20:47

## 2025-03-17 ASSESSMENT — FIBROSIS 4 INDEX
FIB4 SCORE: 0.2
FIB4 SCORE: 0.2

## 2025-03-17 NOTE — LETTER
"  EMPLOYEE’S CLAIM FOR COMPENSATION/ REPORT OF INITIAL TREATMENT  FORM C-4  PLEASE TYPE OR PRINT    EMPLOYEE’S CLAIM - PROVIDE ALL INFORMATION REQUESTED   First Name                    AUSTIN Sierra                  Last Name  Nae Birthdate                    2000                Sex  [x]Person Claim Number (Insurer’s Use Only)     Mailing Address  [address] [place] [address] Age  24 y.o. 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 (Be specific and answer in detail. Use additional sheet if necessary)  repetitive work motion   If you believe that you have an occupational disease, when did you first have knowledge of the disability and its relationship to your employment?  n/a Witnesses to the Accident (if applicable)  Charles Doyle      Nature of Injury or Occupational Disease  Strain  Part(s) of Body Injured or Affected  Shoulder (L) N/A N/A    I CERTIFY THAT THE ABOVE IS TRUE AND CORRECT TO T HE BEST OF MY KNOWLEDGE AND THAT I HAVE " PROVIDED THIS INFORMATION IN ORDER TO OBTAIN THE BENEFITS OF [place]CC[place], OR CHAPTER 617 [place]).  I HEREBY AUTHORIZE ANY PHYSICIAN, CHIROPRACTOR, SURGEON, PRACTITIONER OR ANY OTHER PERSON, ANY HOSPITAL, INCLUDING [facility] OR [facility], ANY  MEDICAL SERVICE ORGANIZATION, ANY INSURANCE COMPANY, OR OTHER INSTITUTION OR ORGANIZATION TO RELEASE TO EACH OTHER, ANY MEDICAL OR OTHER INFORMATION, INCLUDING BENEFITS PAID OR PAYABLE, PERTINENT TO THIS INJURY OR DISEASE, EXCEPT INFORMATION RELATIVE TO DIAGNOSIS, TREATMENT AND/OR COUNSELING FOR AIDS, PSYCHOLOGICAL CONDITIONS, ALCOHOL OR CONTROLLED SUBSTANCES, FOR WHICH I MUST GIVE SPECIFIC AUTHORIZATION.  A PHOTOSTAT OF THIS AUTHORIZATION SHALL BE VALID AS THE ORIGINAL.     Date   Place Employee’s Original or  *Electronic Signature   THIS REPORT MUST BE COMPLETED AND MAILED WITHIN 3 WORKING DAYS OF TREATMENT   Place  [facility]    Name of Facility  [facility]   Date 3/17/2025 Diagnosis and Description of Injury or Occupational Disease  (S16.1XXA) Cervical myofascial strain, initial encounter  The encounter diagnosis was Cervical myofascial strain, initial encounter. Is there evidence that the injured employee was under the influence of alcohol and/or another controlled substance at the time of accident?  []No  [] Yes (if yes, please explain)   Hour 8:08 PM  No   Treatment: Toradol  Medrol and Flexeril    Have you advised the patient to remain off work five days or more?   No  [] Yes  If yes, indicate dates: From_ _                                                      To __ _  [] No   If no, is the injured employee capable of: [] full duty Yes   [] modified duty      If modified duty, specify any limitations / restrictions:__________________  ___ ___________________________     X-Ray Findings:      From information given by the employee, together with medical  evidence, can you directly connect this injury or occupational disease as job incurred?  []Yes   [] No No  Comments:Possibly overuse injury but no acute traumatic event    Is additional medical care by a physician indicated? []Yes [] No  Yes    Do you know of any previous injury or disease contributing to this condition or occupational disease? []Yes [] No (Explain if yes)                          Yes  Comments:History of similar flares in the past   Date  3/17/2025 Print Health Care Provider’s Name  Manish Gonzalez P.A.-C. I certify that the employer’s copy of  this form was delivered to the employer on:   Address  [address] [place] [address] INSURER'S USE ONLY                       [facility]  [id number] Provider’s Tax ID Number  [id number]   Telephone  Dept: [phone]    Health Care Provider’s Original or Electronic Signature      e-MANISH Salcedo P.A.-C.    Degree (MD,DO, DC,JEANNETTE,APRN)  JEANNETTE  Choose (if applicable)      ORIGINAL - TREATING HEALTHCARE PROVIDER PAGE 2 - INSURER/TPA PAGE 3 - EMPLOYER PAGE 4 - EMPLOYEE             Form C-4 (rev.02/25)

## 2025-03-17 NOTE — LETTER
PHYSICIAN’S AND CHIROPRACTIC PHYSICIAN'S   PROGRESS REPORT   CERTIFICATION OF DISABILITY Claim Number:     Social Security Number:    Patient’s Name: Mariela Soni Date of Injury: 3/17/2025   Employer: [employer] Name of MCO (if applicable):      Patient’s Job Description/Occupation: Quality       Previous Injuries/Diseases/Surgeries Contributing to the Condition:  History of similar flares in the past      Diagnosis: (S16.1XXA) Cervical myofascial strain, initial encounter      Related to the Industrial Injury? No     Explain: Possibly overuse injury but no acute traumatic event      Objective Medical Findings: Left-sided cervical paraspinal muscular TTP edema and spasming.  No midline or bony tenderness.  Decreased range of motion.  Full passive active range of motion of shoulder.   strength equal.  No gross deformity, erythema or ecchymosis         None - Discharged                         Stable  Yes                 Ratable  No        Generally Improved                         Condition Worsened                  Condition Same  May Have Suffered a Permanent Disability No     Treatment Plan:    Toradol  Medrol and Flexeril  OTC meds and conservative measures as discussed           No Change in Therapy                  PT/OT Prescribed                      Medication May be Used While Working        Case Management                          PT/OT Discontinued    Consultation    Further Diagnostic Studies:    Prescription(s)               X  Released to FULL DUTY /No Restrictions on (Date):  3/17/2025    Certified TOTALLY TEMPORARILY DISABLED (Indicate Dates) From:   To:      Released to RESTRICTED/Modified Duty on (Date): From:   To:    Restrictions Are:  Temporary      No Sitting    No Standing    No Pulling Other:         No Bending at Waist     No Stooping     No Lifting        No Carrying     No Walking Lifting Restricted to (lbs.):          No Pushing        No Climbing     No Reaching Above  Shoulders       Date of Next Visit:  3/21/2025 Date of this Exam: 3/17/2025 Physician/Chiropractic Physician Name: Luis M Gonzalez P.A.-C. Physician/Chiropractic Physician Signature:  Vaughn Soni DO MPH     [place]:  [facility]  [facility], [address] [place], [place] [address] - Telephone [phone] [place]:  [address] [place] [address], [address] [place], [place] [address] - Telephone [phone]    [email or web address]  D-39 (Rev. 10/24)

## 2025-03-18 ASSESSMENT — ENCOUNTER SYMPTOMS
CONSTITUTIONAL NEGATIVE: 1
NEUROLOGICAL NEGATIVE: 1
CARDIOVASCULAR NEGATIVE: 1
ROS SKIN COMMENTS: INGROWN TOENAIL WITH INFECTION
RESPIRATORY NEGATIVE: 1

## 2025-03-18 NOTE — PROGRESS NOTES
"Subjective     Mariela Soni is a 24 y.o. 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  Pain does radiate into the shoulder.  There was no fall or traumatic event.  No radicular symptoms.  History of similar in the past.  No second job.  Has not taken anything for the symptoms to this point.    ROS           Objective     /68   Pulse (!) 105   Temp 36.4 °C (97.6 °F) (Temporal)   Resp 16   Ht 1.6 m (5' 3\")   Wt 97.1 kg (214 lb)   SpO2 97%   BMI 37.91 kg/m²      Physical Exam    Left-sided cervical paraspinal muscular TTP edema and spasming.  No midline or bony tenderness.  Decreased range of motion.  Full passive active range of motion of shoulder.   strength equal.  No gross deformity, erythema or ecchymosis                        Assessment & Plan  Cervical myofascial strain, initial encounter  Left-sided cervical strain.  No red flag symptoms or radicular symptoms.  Possibly overuse injury but likely not work-related.  Has had similar in the past.  Will treat symptomatically and use conservative measures.  Follow-up in 3 days    Orders:    cyclobenzaprine (FLEXERIL) 5 mg tablet; Take 1-2 Tablets by mouth 3 times a day as needed for Muscle Spasms or Moderate Pain.    methylPREDNISolone (MEDROL DOSEPAK) 4 MG Tablet Therapy Pack; Follow schedule on package instructions.    ketorolac (Toradol) 15 MG/ML injection 15 mg               Return to clinic or go to ED if symptoms worsen or persist. Red flag symptoms and indications for ED discussed at length. Patient voices understanding. All side effects and potential interactions of medication discussed including allergic response, GI upset, sedation, etc.    Please note that this dictation was created using voice recognition software. I have made every reasonable attempt to correct obvious errors, " but I expect that there are errors of grammar and possibly content that I did not discover before finalizing the note.

## 2025-03-18 NOTE — PROGRESS NOTES
"Subjective     Mariela Soni is a very pleasant 24 y.o. person who presents with Foot Problem (Sx started 1 year ago, Right foot ingrown toe nail on greater toe. )            HPI  Ingrown right great toenail with developing infection.  Redness swelling and discharge.  History of similar in the past resulting in multiple procedures.  No fever or joint pain or systemic symptoms.      PMH:  has no past medical history on file.  MEDS:   Current Outpatient Medications:     cephALEXin (KEFLEX) 500 MG Cap, Take 1 Capsule by mouth 3 times a day for 7 days., Disp: 21 Capsule, Rfl: 0    Nutritional Supplements (EQ ESTROBLEND MENOPAUSE PO), , Disp: , Rfl:     spironolactone (Aldactone) 5 mg/mL oral suspension, , Disp: , Rfl:     spironolactone (ALDACTONE) 25 MG Tab, TAKE 1 TABLET BY MOUTH EVERYDAY MAY INCREASE TO TWICE A DAY AFTER 1 WEEK IF TOLERATING, Disp: , Rfl:     cyclobenzaprine (FLEXERIL) 5 mg tablet, Take 1-2 Tablets by mouth 3 times a day as needed for Muscle Spasms or Moderate Pain., Disp: 30 Tablet, Rfl: 0    methylPREDNISolone (MEDROL DOSEPAK) 4 MG Tablet Therapy Pack, Follow schedule on package instructions., Disp: 21 Tablet, Rfl: 0  ALLERGIES: No Known Allergies  SURGHX: History reviewed. No pertinent surgical history.  SOCHX:  reports that she has never smoked. She has never used smokeless tobacco. She reports that she does not currently use alcohol. She reports that she does not currently use drugs after having used the following drugs: Inhaled and Marijuana.  FH: family history is not on file.        Review of Systems   Constitutional: Negative.    Respiratory: Negative.     Cardiovascular: Negative.    Skin:         Ingrown toenail with infection   Neurological: Negative.        Medications, Allergies, and current problem list reviewed today in Epic           Objective     /68   Pulse (!) 105   Temp 36.4 °C (97.6 °F) (Temporal)   Resp 16   Ht 1.6 m (5' 3\")   Wt 97.1 kg (214 lb)   SpO2 " 97%   BMI 37.91 kg/m²      Physical Exam  Vitals and nursing note reviewed.   Constitutional:       General: She is not in acute distress.     Appearance: Normal appearance. She is well-developed. She is not diaphoretic.   HENT:      Head: Normocephalic.      Right Ear: Hearing and external ear normal.      Left Ear: Hearing and external ear normal.      Nose: Nose normal.      Mouth/Throat:      Mouth: Mucous membranes are moist.   Eyes:      General:         Right eye: No discharge.         Left eye: No discharge.      Conjunctiva/sclera: Conjunctivae normal.   Cardiovascular:      Rate and Rhythm: Normal rate and regular rhythm.   Pulmonary:      Effort: Pulmonary effort is normal. No respiratory distress.      Breath sounds: Normal breath sounds.   Musculoskeletal:      Cervical back: Normal range of motion and neck supple.   Skin:     General: Skin is warm and dry.      Comments: Swelling, redness, tenderness and discharge from the lateral nailbed of the right great toe.  No red streaking or joint pain   Neurological:      General: No focal deficit present.      Mental Status: She is alert and oriented to person, place, and time. Mental status is at baseline.   Psychiatric:         Mood and Affect: Mood normal.         Behavior: Behavior normal.         Thought Content: Thought content normal.         Judgment: Judgment normal.                                  Assessment & Plan  Ingrown toenail of right foot with infection  Keflex and warm soaks.  OTC meds and conservative measures.  Referral to podiatry has been placed.    Orders:    Referral to Podiatry    cephALEXin (KEFLEX) 500 MG Cap; Take 1 Capsule by mouth 3 times a day for 7 days.           I personally reviewed prior external notes and test results pertinent to today's visit. Return to clinic or go to ED if symptoms worsen or persist. Red flag symptoms and indications for ED discussed at length. Patient/Parent/Guardian voices understanding.  AVS with  post-visit instructions printed and provided or given verbally.  Follow-up with your primary care provider in 3-5 days. All side effects and potential interactions of prescribed medication discussed including allergic response, GI upset, tendon injury, rash, sedation, OCP effectiveness, etc.    Please note that this dictation was created using voice recognition software. I have made every reasonable attempt to correct obvious errors, but I expect that there are errors of grammar and possibly content that I did not discover before finalizing the note.

## 2025-03-20 NOTE — Clinical Note
REFERRAL APPROVAL NOTICE         Sent on March 20, 2025                   Mariela Olivia Soni  [address] [place] [address]                   Dear Ms. Soni,    After a careful review of the medical information and benefit coverage, [facility] has processed your referral. See below for additional details.    If applicable, you must be actively enrolled with your insurance for coverage of the authorized service. If you have any questions regarding your coverage, please contact your insurance directly.    REFERRAL INFORMATION   Referral #:  [id number]  Referred-To Provider    Referred-By Provider:  Podiatry    Luis M Gonzalez P.A.-C.   [facility]      [address]  [address]  [facility] [address]  [phone] [id number]  [facility] [address]  [phone]    Referral Start Date:  03/17/2025  Referral End Date:   03/17/2026             SCHEDULING  If you do not already have an appointment, please call [phone] to make an appointment.     MORE INFORMATION  If you do not already have a [employer] account, sign up at: [email or web address]  You can access your medical information, make appointments, see lab results, billing information, and more.  If you have questions regarding this referral, please contact  the [facility] Referrals department at:             [phone]. Monday - Friday 8:00AM - 5:00PM.     Sincerely,    [facility]

## 2025-05-06 NOTE — Clinical Note
REFERRAL APPROVAL NOTICE         Sent on May 6, 2025                   Mariela Olivia Nae  [address]  [place] [address]                   Dear Ms. Soni,    After a careful review of the medical information and benefit coverage, [facility] has processed your referral. See below for additional details.    If applicable, you must be actively enrolled with your insurance for coverage of the authorized service. If you have any questions regarding your coverage, please contact your insurance directly.    REFERRAL INFORMATION   Referral #:  [id number]  Referred-To Department    Referred-By Provider:  Pulmonary and Sleep Medicine    RAFAEL Hebert   Pulmonary/sleep [facility]      [address]  [address]  [place] [address]  [phone] [address]  [place] [id number] [phone]    Referral Start Date:  04/29/2025  Referral End Date:   04/29/2026           SCHEDULING  If you do not already have an appointment, please call [phone] to make an appointment.   MORE INFORMATION  As a reminder, [facility] ownership has changed, meaning this location is now owned and operated by [facility]. As such, we want to clarify that our patients should expect to receive two separate bills for the services received at [facility] - one representing the [facility] facility fees as the owner of the establishment, and the other to represent the physician's services and subsequent fees. You can speak with your insurance carrier for a pricing estimate by calling the customer service number on the back of your card and ask about charges for a hospital outpatient visit.  If you do not already have a [employer] account, sign up at: [email or web address]  You can access your medical information, make appointments, see lab  results, billing information, and more.  If you have questions regarding this referral, please contact  the [facility] department at:             [phone]. Monday - Friday 7:30AM - 5:00PM.      Sincerely,  [facility]

## (undated) DEVICE — TUBING CLEARLINK DUO-VENT - C-FLO (48EA/CA)

## (undated) DEVICE — GOWN SURGICAL X-LARGE ULTRA - FILM-REINFORCED (20/CA)

## (undated) DEVICE — PACK C-SECTION (2EA/CA)

## (undated) DEVICE — GLOVE BIOGEL SZ 6.5 SURGICAL PF LTX (50PR/BX 4BX/CA)

## (undated) DEVICE — CATHETER IV NON-SAFETY 18 GA X 1 1/4 (50/BX 4BX/CA)

## (undated) DEVICE — CHLORAPREP 26 ML APPLICATOR - ORANGE TINT(25/CA)

## (undated) DEVICE — GLOVE BIOGEL INDICATOR SZ 6.5 SURGICAL PF LTX - (50PR/BX 4BX/CA)

## (undated) DEVICE — CANISTER SUCTION 3000ML MECHANICAL FILTER AUTO SHUTOFF MEDI-VAC NONSTERILE LF DISP  (40EA/CA)

## (undated) DEVICE — SWABSTICK BENZOIN SINGLE (50EA/BX)

## (undated) DEVICE — SUTURE 1 CHROMIC CTX ETHICON - (36PK/BX)

## (undated) DEVICE — TRAY BLADDER CARE W/ 16 FR FOLEY CATHETER STATLOCK  (10/CA)

## (undated) DEVICE — 4-0 VICRYL PS-2

## (undated) DEVICE — HEAD HOLDER JUNIOR/ADULT

## (undated) DEVICE — SET EXTENSION WITH 2 PORTS (48EA/CA) ***PART #2C8610 IS A SUBSTITUTE*****

## (undated) DEVICE — GLOVE BIOGEL SZ 7 SURGICAL PF LTX - (50PR/BX 4BX/CA)

## (undated) DEVICE — DETERGENT RENUZYME PLUS 10 OZ PACKET (50/BX)

## (undated) DEVICE — SHEATH RO 4F 10CM (10EA/BX)

## (undated) DEVICE — SUTURE 0 VICRYL PLUS CT 36 (36PK/BX)"

## (undated) DEVICE — TRAY SPINAL ANESTHESIA NON-SAFETY (10/CA)

## (undated) DEVICE — SUTURE 3-0 VICRYL PLUS CT-1 - 36 INCH (36/BX)

## (undated) DEVICE — GLOVE BIOGEL SZ 8 SURGICAL PF LTX - (50PR/BX 4BX/CA)

## (undated) DEVICE — WATER IRRIG. STER. 1500 ML - (9/CA)

## (undated) DEVICE — KIT  I.V. START (100EA/CA)

## (undated) DEVICE — SUTURE 0 VICRYL PLUS CT-1 - 36 INCH (36/BX)

## (undated) DEVICE — SODIUM CHL IRRIGATION 0.9% 1000ML (12EA/CA)

## (undated) DEVICE — SUTURE 2-0 CHROMIC GUT CT-1 27 (36PK/BX)"

## (undated) DEVICE — DRESSING INTERCEED ABSORBABLE ADHESION BARRIER TC7 (10EA/CA)

## (undated) DEVICE — CLOSURE SKIN STRIP 1/2 X 4 IN - (STERI STRIP) (50/BX 4BX/CA)

## (undated) DEVICE — TAPE CLOTH MEDIPORE 6 INCH - (12RL/CA)

## (undated) DEVICE — BLANKET UNDERBODY FULL ACCES - (5/CA)

## (undated) DEVICE — STAPLER SKIN DISP - (6/BX 10BX/CA) VISISTAT

## (undated) DEVICE — SLEEVE, SEQUENTIAL CALF REG

## (undated) DEVICE — NEEDLE SAFETY 25 GA X 5/8 IN LL HYPO (100/BX 12BX/CA) WAS #6351

## (undated) DEVICE — GLOVE BIOGEL SZ 7.5 SURGICAL PF LTX - (50PR/BX 4BX/CA)

## (undated) DEVICE — BAG, SPONGE COUNT 50600

## (undated) DEVICE — PACK ROOM TURNOVER L&D (12/CA)

## (undated) DEVICE — PAD LAP STERILE 18 X 18 - (5/PK 40PK/CA)

## (undated) DEVICE — LACTATED RINGERS INJ 1000 ML - (14EA/CA 60CA/PF)

## (undated) DEVICE — ELECTRODE DUAL RETURN W/ CORD - (50/PK)

## (undated) DEVICE — PLUMEPEN ULTRA 3/8 IN X 10 FT HOSE (20EA/CA)